# Patient Record
Sex: FEMALE | Race: WHITE | NOT HISPANIC OR LATINO | Employment: OTHER | URBAN - METROPOLITAN AREA
[De-identification: names, ages, dates, MRNs, and addresses within clinical notes are randomized per-mention and may not be internally consistent; named-entity substitution may affect disease eponyms.]

---

## 2023-02-17 PROBLEM — R23.9 SKIN SYMPTOM: Status: ACTIVE | Noted: 2023-02-17

## 2023-02-17 PROBLEM — E03.9 HYPOTHYROIDISM: Status: ACTIVE | Noted: 2023-02-17

## 2023-02-17 PROBLEM — M85.80 OSTEOPENIA: Status: ACTIVE | Noted: 2023-02-17

## 2023-02-17 PROBLEM — K52.832 LYMPHOCYTIC COLITIS: Status: ACTIVE | Noted: 2023-02-17

## 2023-02-17 PROBLEM — M25.80 SESAMOIDITIS: Status: ACTIVE | Noted: 2023-02-17

## 2023-02-17 PROBLEM — D51.1: Status: ACTIVE | Noted: 2023-02-17

## 2023-02-17 PROBLEM — R09.82 POSTNASAL DRIP: Status: ACTIVE | Noted: 2023-02-17

## 2023-02-17 PROBLEM — R68.84 JAW PAIN: Status: ACTIVE | Noted: 2023-02-17

## 2023-02-17 PROBLEM — M19.032: Status: ACTIVE | Noted: 2023-02-17

## 2023-02-17 PROBLEM — R60.0 LEG EDEMA, LEFT: Status: ACTIVE | Noted: 2023-02-17

## 2023-02-17 PROBLEM — M54.2 CERVICALGIA: Status: ACTIVE | Noted: 2023-02-17

## 2023-02-17 PROBLEM — H91.90 HEARING LOSS: Status: ACTIVE | Noted: 2023-02-17

## 2023-02-17 PROBLEM — R26.81 UNSTEADY GAIT: Status: ACTIVE | Noted: 2023-02-17

## 2023-02-17 PROBLEM — R63.4 ABNORMAL WEIGHT LOSS: Status: ACTIVE | Noted: 2023-02-17

## 2023-02-17 PROBLEM — R25.1 EPISODE OF SHAKING: Status: ACTIVE | Noted: 2023-02-17

## 2023-02-17 PROBLEM — R26.2 DIFFICULTY WALKING: Status: ACTIVE | Noted: 2023-02-17

## 2023-02-17 PROBLEM — E53.8 VITAMIN B12 DEFICIENCY: Status: ACTIVE | Noted: 2023-02-17

## 2023-02-17 PROBLEM — M17.0 PRIMARY OSTEOARTHRITIS OF BOTH KNEES: Status: ACTIVE | Noted: 2023-02-17

## 2023-02-17 PROBLEM — N39.0 ACUTE UTI: Status: ACTIVE | Noted: 2023-02-17

## 2023-02-17 PROBLEM — M76.72 PERONEAL TENDONITIS, LEFT: Status: ACTIVE | Noted: 2023-02-17

## 2023-02-17 PROBLEM — K59.09 CHRONIC CONSTIPATION: Status: ACTIVE | Noted: 2023-02-17

## 2023-02-17 PROBLEM — K13.0 ANGULAR CHEILITIS: Status: ACTIVE | Noted: 2023-02-17

## 2023-02-17 PROBLEM — R91.1 PULMONARY NODULE: Status: ACTIVE | Noted: 2023-02-17

## 2023-02-17 PROBLEM — H65.90 SEROUS OTITIS MEDIA: Status: ACTIVE | Noted: 2023-02-17

## 2023-02-17 PROBLEM — H90.3 BILATERAL SENSORINEURAL HEARING LOSS: Status: ACTIVE | Noted: 2023-02-17

## 2023-02-17 PROBLEM — M25.549 METACARPOPHALANGEAL JOINT PAIN: Status: ACTIVE | Noted: 2023-02-17

## 2023-02-17 PROBLEM — M25.672 STIFFNESS OF LEFT ANKLE, NOT ELSEWHERE CLASSIFIED: Status: ACTIVE | Noted: 2023-02-17

## 2023-02-17 PROBLEM — R68.83 CHILLS (WITHOUT FEVER): Status: ACTIVE | Noted: 2023-02-17

## 2023-02-17 PROBLEM — I10 BENIGN ESSENTIAL HYPERTENSION: Status: ACTIVE | Noted: 2023-02-17

## 2023-02-17 PROBLEM — K90.41 GLUTEN ENTEROPATHY: Status: ACTIVE | Noted: 2023-02-17

## 2023-02-17 PROBLEM — J30.2 SEASONAL ALLERGIES: Status: ACTIVE | Noted: 2023-02-17

## 2023-02-17 PROBLEM — S09.92XA NASAL TRAUMA: Status: ACTIVE | Noted: 2023-02-17

## 2023-02-17 PROBLEM — M16.12 PRIMARY LOCALIZED OSTEOARTHRITIS OF LEFT HIP: Status: ACTIVE | Noted: 2023-02-17

## 2023-02-17 PROBLEM — M17.12 OSTEOARTHRITIS OF LEFT KNEE: Status: ACTIVE | Noted: 2023-02-17

## 2023-02-17 PROBLEM — E87.1 HYPONATREMIA: Status: ACTIVE | Noted: 2023-02-17

## 2023-02-17 PROBLEM — M19.049 ARTHRITIS, DEGENERATIVE, LOCALIZED, PRIMARY, HAND: Status: ACTIVE | Noted: 2023-02-17

## 2023-02-17 PROBLEM — R54 ADVANCED AGE: Status: ACTIVE | Noted: 2023-02-17

## 2023-02-17 PROBLEM — J31.0 CHRONIC RHINITIS: Status: ACTIVE | Noted: 2023-02-17

## 2023-02-17 PROBLEM — F43.10 PTSD (POST-TRAUMATIC STRESS DISORDER): Status: ACTIVE | Noted: 2023-02-17

## 2023-02-17 PROBLEM — M35.00 H/O SJOGREN'S DISEASE (MULTI): Status: ACTIVE | Noted: 2023-02-17

## 2023-02-17 PROBLEM — R42 VERTIGO: Status: ACTIVE | Noted: 2023-02-17

## 2023-02-17 RX ORDER — CLOTRIMAZOLE 1 %
CREAM (GRAM) TOPICAL
COMMUNITY
Start: 2021-05-26 | End: 2023-03-20 | Stop reason: SDUPTHER

## 2023-02-17 RX ORDER — LISINOPRIL 10 MG/1
1 TABLET ORAL DAILY
COMMUNITY
Start: 2018-09-28 | End: 2023-03-20 | Stop reason: SDUPTHER

## 2023-02-17 RX ORDER — LEVOTHYROXINE SODIUM 50 UG/1
50 TABLET ORAL DAILY
COMMUNITY
Start: 2012-11-12 | End: 2023-07-26 | Stop reason: SDUPTHER

## 2023-03-01 ENCOUNTER — DOCUMENTATION (OUTPATIENT)
Dept: PRIMARY CARE | Facility: CLINIC | Age: 88
End: 2023-03-01
Payer: MEDICARE

## 2023-03-13 ENCOUNTER — PATIENT OUTREACH (OUTPATIENT)
Dept: PRIMARY CARE | Facility: CLINIC | Age: 88
End: 2023-03-13
Payer: MEDICARE

## 2023-03-17 ENCOUNTER — APPOINTMENT (OUTPATIENT)
Dept: PRIMARY CARE | Facility: CLINIC | Age: 88
End: 2023-03-17
Payer: MEDICARE

## 2023-03-17 ENCOUNTER — TELEPHONE (OUTPATIENT)
Dept: PRIMARY CARE | Facility: CLINIC | Age: 88
End: 2023-03-17

## 2023-03-17 DIAGNOSIS — N30.00 ACUTE CYSTITIS WITHOUT HEMATURIA: Primary | ICD-10-CM

## 2023-03-17 RX ORDER — NITROFURANTOIN 25; 75 MG/1; MG/1
100 CAPSULE ORAL 2 TIMES DAILY
Qty: 14 EACH | Refills: 0 | Status: SHIPPED | OUTPATIENT
Start: 2023-03-17 | End: 2023-03-20 | Stop reason: ENTERED-IN-ERROR

## 2023-03-17 NOTE — PROGRESS NOTES
I received a call from the patient who stated that she has a UTI.  Patient has burning with urination.  Urine analysis and culture ordered.  Patient will come to the lab today had give a sample.  Patient stated that her bowels are okay now.  They are not dark in color and are a normal consistency.

## 2023-03-20 DIAGNOSIS — R23.9 SKIN SYMPTOM: ICD-10-CM

## 2023-03-20 DIAGNOSIS — I10 BENIGN ESSENTIAL HYPERTENSION: Primary | ICD-10-CM

## 2023-03-20 RX ORDER — LISINOPRIL 10 MG/1
10 TABLET ORAL DAILY
Qty: 90 TABLET | Refills: 2 | Status: SHIPPED | OUTPATIENT
Start: 2023-03-20 | End: 2023-12-12 | Stop reason: SDUPTHER

## 2023-03-20 RX ORDER — CLOTRIMAZOLE 1 %
CREAM (GRAM) TOPICAL 2 TIMES DAILY
Qty: 14 G | Refills: 1 | Status: SHIPPED | OUTPATIENT
Start: 2023-03-20 | End: 2023-04-19

## 2023-03-30 ENCOUNTER — APPOINTMENT (OUTPATIENT)
Dept: PRIMARY CARE | Facility: CLINIC | Age: 88
End: 2023-03-30
Payer: MEDICARE

## 2023-04-17 ENCOUNTER — PATIENT OUTREACH (OUTPATIENT)
Dept: PRIMARY CARE | Facility: CLINIC | Age: 88
End: 2023-04-17
Payer: MEDICARE

## 2023-04-17 NOTE — PROGRESS NOTES
I called and spoke with the patient who stated that she was doing well.  No change in her health since we last spoke.  Patient is taking CBD gummies for her joints on recommendation of her therapist.  Per the patient the gummies help her joints. No refills needed at this time.  Patient will call with any questions or concerns.

## 2023-05-12 ENCOUNTER — TELEPHONE (OUTPATIENT)
Dept: PRIMARY CARE | Facility: CLINIC | Age: 88
End: 2023-05-12
Payer: MEDICARE

## 2023-05-12 NOTE — TELEPHONE ENCOUNTER
Patient would like to confirm if blood work is necessary for upcoming appointment. Please advise at home number.

## 2023-05-16 ENCOUNTER — PATIENT OUTREACH (OUTPATIENT)
Dept: PRIMARY CARE | Facility: CLINIC | Age: 88
End: 2023-05-16
Payer: MEDICARE

## 2023-05-16 NOTE — PROGRESS NOTES
I called and spoke with the patient who stated that she is doing well.   Pert he patient there are no changes to her health since we last spoke.  No medication refills needed at this time.  Patient is taking 1 CBD gummy daily for her joint pain with good results.  Her daughter or caregiver provide transportation for her.  Patient has a follow up with Dr Perez in August.  No sooner appointments available at this time.  Patient to call with any questions or concerns.

## 2023-06-16 ENCOUNTER — PATIENT OUTREACH (OUTPATIENT)
Dept: PRIMARY CARE | Facility: CLINIC | Age: 88
End: 2023-06-16
Payer: COMMERCIAL

## 2023-06-16 DIAGNOSIS — M35.00 H/O SJOGREN'S DISEASE (MULTI): ICD-10-CM

## 2023-06-16 DIAGNOSIS — I10 BENIGN ESSENTIAL HYPERTENSION: ICD-10-CM

## 2023-06-16 PROCEDURE — 99490 CHRNC CARE MGMT STAFF 1ST 20: CPT | Performed by: INTERNAL MEDICINE

## 2023-06-16 NOTE — PROGRESS NOTES
I called and spoke with the patient who stated that she was doing well for her age.  Patient is concerned about her daughter who has skin CA and had surgery.  Patient denies any falls since we last spoke.  Patient is using her cane and walker which maker her feel more confident. Patient is taking her medications as directed.  No refills needed at this time.  Patient will call with any questions or concerns.

## 2023-07-26 DIAGNOSIS — E03.9 ACQUIRED HYPOTHYROIDISM: Primary | ICD-10-CM

## 2023-07-26 RX ORDER — LEVOTHYROXINE SODIUM 50 UG/1
50 TABLET ORAL DAILY
Qty: 90 TABLET | Refills: 1 | Status: SHIPPED | OUTPATIENT
Start: 2023-07-26 | End: 2024-01-23 | Stop reason: SDUPTHER

## 2023-08-15 ENCOUNTER — PATIENT OUTREACH (OUTPATIENT)
Dept: PRIMARY CARE | Facility: CLINIC | Age: 88
End: 2023-08-15
Payer: COMMERCIAL

## 2023-08-15 DIAGNOSIS — M35.00 H/O SJOGREN'S DISEASE (MULTI): ICD-10-CM

## 2023-08-15 DIAGNOSIS — I10 BENIGN ESSENTIAL HYPERTENSION: ICD-10-CM

## 2023-08-16 ENCOUNTER — PATIENT OUTREACH (OUTPATIENT)
Dept: PRIMARY CARE | Facility: CLINIC | Age: 88
End: 2023-08-16
Payer: MEDICARE

## 2023-08-16 DIAGNOSIS — M35.00 H/O SJOGREN'S DISEASE (MULTI): ICD-10-CM

## 2023-08-16 DIAGNOSIS — I10 BENIGN ESSENTIAL HYPERTENSION: ICD-10-CM

## 2023-08-16 PROCEDURE — 99490 CHRNC CARE MGMT STAFF 1ST 20: CPT | Performed by: INTERNAL MEDICINE

## 2023-08-16 NOTE — PROGRESS NOTES
I received a call from the patient who stated that she is doing well for being 89 years old.  Per the patient there has been no changes in her health.  He weight is stable.  No medication refills needed.  Patient has a appointment with Dr Perez 8/29/2023.  Patient was instructed to call with any questions or concerns.

## 2023-08-29 ENCOUNTER — OFFICE VISIT (OUTPATIENT)
Dept: PRIMARY CARE | Facility: CLINIC | Age: 88
End: 2023-08-29
Payer: COMMERCIAL

## 2023-08-29 VITALS
RESPIRATION RATE: 16 BRPM | WEIGHT: 106.8 LBS | BODY MASS INDEX: 20.86 KG/M2 | SYSTOLIC BLOOD PRESSURE: 119 MMHG | HEART RATE: 73 BPM | TEMPERATURE: 97.9 F | DIASTOLIC BLOOD PRESSURE: 69 MMHG | OXYGEN SATURATION: 99 %

## 2023-08-29 DIAGNOSIS — M35.00 H/O SJOGREN'S DISEASE (MULTI): ICD-10-CM

## 2023-08-29 DIAGNOSIS — M54.2 CERVICALGIA: ICD-10-CM

## 2023-08-29 DIAGNOSIS — H90.3 BILATERAL SENSORINEURAL HEARING LOSS: ICD-10-CM

## 2023-08-29 DIAGNOSIS — R54 ADVANCED AGE: ICD-10-CM

## 2023-08-29 DIAGNOSIS — I10 BENIGN ESSENTIAL HYPERTENSION: Primary | ICD-10-CM

## 2023-08-29 DIAGNOSIS — Z51.5 QUALITY OF LIFE PALLIATIVE CARE ENCOUNTER: ICD-10-CM

## 2023-08-29 DIAGNOSIS — E03.9 ACQUIRED HYPOTHYROIDISM: ICD-10-CM

## 2023-08-29 DIAGNOSIS — M35.00 SJOGREN SYNDROME, UNSPECIFIED (MULTI): ICD-10-CM

## 2023-08-29 DIAGNOSIS — J30.2 SEASONAL ALLERGIES: ICD-10-CM

## 2023-08-29 DIAGNOSIS — E53.8 VITAMIN B12 DEFICIENCY: ICD-10-CM

## 2023-08-29 DIAGNOSIS — R26.81 UNSTEADY GAIT: ICD-10-CM

## 2023-08-29 PROBLEM — Z85.828 PERSONAL HISTORY OF OTHER MALIGNANT NEOPLASM OF SKIN: Status: ACTIVE | Noted: 2022-01-08

## 2023-08-29 PROBLEM — L03.90 CELLULITIS: Status: ACTIVE | Noted: 2023-08-29

## 2023-08-29 PROBLEM — L82.1 OTHER SEBORRHEIC KERATOSIS: Status: ACTIVE | Noted: 2022-01-08

## 2023-08-29 PROBLEM — L28.0 LICHEN SIMPLEX CHRONICUS: Status: ACTIVE | Noted: 2022-01-08

## 2023-08-29 PROBLEM — L60.9 NAIL DISORDER, UNSPECIFIED: Status: ACTIVE | Noted: 2022-01-08

## 2023-08-29 PROBLEM — R68.89 LIP SYMPTOM: Status: ACTIVE | Noted: 2023-08-29

## 2023-08-29 PROBLEM — L71.0 PERIORAL DERMATITIS: Status: ACTIVE | Noted: 2022-01-08

## 2023-08-29 PROBLEM — R21 RASH AND OTHER NONSPECIFIC SKIN ERUPTION: Status: ACTIVE | Noted: 2022-01-08

## 2023-08-29 PROCEDURE — 99215 OFFICE O/P EST HI 40 MIN: CPT | Performed by: INTERNAL MEDICINE

## 2023-08-29 PROCEDURE — 1160F RVW MEDS BY RX/DR IN RCRD: CPT | Performed by: INTERNAL MEDICINE

## 2023-08-29 PROCEDURE — 3074F SYST BP LT 130 MM HG: CPT | Performed by: INTERNAL MEDICINE

## 2023-08-29 PROCEDURE — 3078F DIAST BP <80 MM HG: CPT | Performed by: INTERNAL MEDICINE

## 2023-08-29 PROCEDURE — 1036F TOBACCO NON-USER: CPT | Performed by: INTERNAL MEDICINE

## 2023-08-29 PROCEDURE — 1125F AMNT PAIN NOTED PAIN PRSNT: CPT | Performed by: INTERNAL MEDICINE

## 2023-08-29 PROCEDURE — 1159F MED LIST DOCD IN RCRD: CPT | Performed by: INTERNAL MEDICINE

## 2023-08-29 RX ORDER — UBIDECARENONE 75 MG
500 CAPSULE ORAL DAILY
COMMUNITY

## 2023-08-29 RX ORDER — MUPIROCIN 20 MG/G
OINTMENT TOPICAL
COMMUNITY
Start: 2023-02-16

## 2023-08-29 RX ORDER — AMLODIPINE BESYLATE 5 MG/1
1 TABLET ORAL DAILY
COMMUNITY
End: 2024-01-22

## 2023-08-29 RX ORDER — LIDOCAINE 50 MG/G
PATCH TOPICAL
COMMUNITY
Start: 2018-06-15

## 2023-08-29 RX ORDER — CYANOCOBALAMIN 1000 UG/ML
1 INJECTION, SOLUTION INTRAMUSCULAR; SUBCUTANEOUS
COMMUNITY

## 2023-08-29 RX ORDER — IBUPROFEN 400 MG/1
TABLET ORAL EVERY 8 HOURS PRN
COMMUNITY
Start: 2015-02-09

## 2023-08-29 RX ORDER — FLUTICASONE PROPIONATE 50 MCG
SPRAY, SUSPENSION (ML) NASAL
COMMUNITY

## 2023-08-29 RX ORDER — TRIAMCINOLONE ACETONIDE 55 UG/1
SPRAY, METERED NASAL
COMMUNITY
Start: 2023-06-13

## 2023-08-29 ASSESSMENT — PATIENT HEALTH QUESTIONNAIRE - PHQ9
SUM OF ALL RESPONSES TO PHQ9 QUESTIONS 1 AND 2: 0
2. FEELING DOWN, DEPRESSED OR HOPELESS: NOT AT ALL
1. LITTLE INTEREST OR PLEASURE IN DOING THINGS: NOT AT ALL

## 2023-08-29 ASSESSMENT — ENCOUNTER SYMPTOMS
LOSS OF SENSATION IN FEET: 0
DEPRESSION: 0
OCCASIONAL FEELINGS OF UNSTEADINESS: 1
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
## 2023-08-30 PROBLEM — R09.82 POSTNASAL DRIP: Status: RESOLVED | Noted: 2023-02-17 | Resolved: 2023-08-30

## 2023-08-30 PROBLEM — L03.90 CELLULITIS: Status: RESOLVED | Noted: 2023-08-29 | Resolved: 2023-08-30

## 2023-08-30 PROBLEM — H91.90 HEARING LOSS: Status: RESOLVED | Noted: 2023-02-17 | Resolved: 2023-08-30

## 2023-08-30 PROBLEM — S09.92XA NASAL TRAUMA: Status: RESOLVED | Noted: 2023-02-17 | Resolved: 2023-08-30

## 2023-08-30 PROBLEM — R63.4 ABNORMAL WEIGHT LOSS: Status: RESOLVED | Noted: 2023-02-17 | Resolved: 2023-08-30

## 2023-09-15 ENCOUNTER — PATIENT OUTREACH (OUTPATIENT)
Dept: PRIMARY CARE | Facility: CLINIC | Age: 88
End: 2023-09-15
Payer: MEDICARE

## 2023-09-15 DIAGNOSIS — M35.00 SJOGREN SYNDROME, UNSPECIFIED (MULTI): ICD-10-CM

## 2023-09-15 DIAGNOSIS — I10 BENIGN ESSENTIAL HYPERTENSION: ICD-10-CM

## 2023-09-18 ENCOUNTER — PATIENT OUTREACH (OUTPATIENT)
Dept: PRIMARY CARE | Facility: CLINIC | Age: 88
End: 2023-09-18
Payer: MEDICARE

## 2023-09-18 DIAGNOSIS — I10 BENIGN ESSENTIAL HYPERTENSION: ICD-10-CM

## 2023-09-18 DIAGNOSIS — M35.00 SJOGREN SYNDROME, UNSPECIFIED (MULTI): ICD-10-CM

## 2023-09-18 PROCEDURE — 99490 CHRNC CARE MGMT STAFF 1ST 20: CPT | Performed by: INTERNAL MEDICINE

## 2023-09-18 PROCEDURE — 99439 CHRNC CARE MGMT STAF EA ADDL: CPT | Performed by: INTERNAL MEDICINE

## 2023-09-18 NOTE — PROGRESS NOTES
I called and spoke with the patient who stated that she was doing well.  Per the patient she has another aid helping her at home.  Patient likes her very much.  [facility] like [facility] Perez requested.  Per the patient she is not able to eat there because she is gluten free.  Patient also felt the the average person there was 25 years her stone.  Patient did not feel comfortable going there.  Per the patient she stays active with her aids going out to lunch and occasionally [place].    Patient denies any change in health since we last spoke or she saw Dr. Perez.  Patient call her refills into [employer].  Patient was instructed to call with any questions or concerns.

## 2023-09-21 ENCOUNTER — PATIENT OUTREACH (OUTPATIENT)
Dept: PRIMARY CARE | Facility: CLINIC | Age: 88
End: 2023-09-21
Payer: MEDICARE

## 2023-09-21 DIAGNOSIS — M35.00 SJOGREN SYNDROME, UNSPECIFIED (MULTI): ICD-10-CM

## 2023-09-21 DIAGNOSIS — I10 BENIGN ESSENTIAL HYPERTENSION: ICD-10-CM
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
## 2023-10-02 ENCOUNTER — TELEPHONE (OUTPATIENT)
Dept: PRIMARY CARE | Facility: CLINIC | Age: 88
End: 2023-10-02
Payer: MEDICARE

## 2023-10-02 DIAGNOSIS — M16.12 PRIMARY LOCALIZED OSTEOARTHRITIS OF LEFT HIP: Primary | ICD-10-CM

## 2023-10-02 DIAGNOSIS — R26.81 UNSTEADY GAIT: ICD-10-CM

## 2023-10-03 ENCOUNTER — PATIENT OUTREACH (OUTPATIENT)
Dept: PRIMARY CARE | Facility: CLINIC | Age: 88
End: 2023-10-03
Payer: MEDICARE

## 2023-10-03 DIAGNOSIS — I10 BENIGN ESSENTIAL HYPERTENSION: ICD-10-CM

## 2023-10-03 DIAGNOSIS — M35.00 SJOGREN SYNDROME, UNSPECIFIED (MULTI): ICD-10-CM

## 2023-10-10 ENCOUNTER — CLINICAL SUPPORT (OUTPATIENT)
Dept: AUDIOLOGY | Facility: CLINIC | Age: 88
End: 2023-10-10
Payer: MEDICARE

## 2023-10-10 DIAGNOSIS — H90.3 SENSORINEURAL HEARING LOSS (SNHL) OF BOTH EARS: Primary | ICD-10-CM

## 2023-10-10 PROCEDURE — 92593 HC HEARING AID CHECK, BOTH EARS LEVEL ONE: CPT | Performed by: AUDIOLOGIST

## 2023-10-11 ASSESSMENT — PAIN SCALES - GENERAL: PAINLEVEL_OUTOF10: 0 - NO PAIN

## 2023-10-11 ASSESSMENT — PAIN - FUNCTIONAL ASSESSMENT: PAIN_FUNCTIONAL_ASSESSMENT: 0-10

## 2023-10-11 ASSESSMENT — ENCOUNTER SYMPTOMS: OCCASIONAL FEELINGS OF UNSTEADINESS: 0

## 2023-10-11 NOTE — PROGRESS NOTES
AUDIOLOGY HEARING AID CHECK      Name:  Jennifer Rangel  :  [id number]  Age:  89 y.o.  Date of Visit: 10/11/23    History:  Reason for visit:  Ms. Rangel is seen today for a hearing aid check.      Procedure:   Note, the patient is currently wearing the following devices:   Right Ear:            Make/Model: Phonak Audeo P50R            Dome/: small closed dome; #1 S             Serial Number: [id number]            Warranty: 2024     Left Ear:            Make/Model: Phonak Audeo P50R            Dome/: small closed dome; #1 S             Serial Number: [id number]            Warranty: 2024  Using a large  for lithium ion batteries.   Aids dispensed on 2021 and billed to [employer].      Otoscopic Evaluation:   Right Ear: Otoscopy for the right ear revealed a clear healthy canal and a healthy tympanic membrane was visualized.   Left Ear: Otoscopy for the left ear revealed a clear healthy canal and a healthy tympanic membrane was visualized.     The patient stated she is not having any major concerns with the hearing aids but is interested in a current check. Stated overall she is hearing well, however, at times she has noticed certain speakers are a little too soft.  Listening check initially indicated the aids were a little weak.   Changed the wax guards, cleaned the microphones and replaced with new domes. Increased to 90% acclimation level and the patient felt she was hearing well.   Increased the soft compression and decreased the loud speech slightly. Stated she was hearing well with the current program changes.   Denied any additional adjustments. Reported she is hearing and doing well with the current aids.     RECOMMENDATIONS:  * Continue medical follow up with managing physician.  * Continued use of current hearing aids.   * Use effective communication strategies such as asking the speaker to gain attention prior to speaking, speaking  in the same room, repeating words that were heard, etc.  * Return as needed for annual hearing testing and hearing aid checks.     PATIENT EDUCATION:   Questions were addressed and the patient was encouraged to contact our department should concerns arise.  The patient was seen from  2:20-3:35 pm.

## 2023-11-06 ENCOUNTER — PATIENT OUTREACH (OUTPATIENT)
Dept: PRIMARY CARE | Facility: CLINIC | Age: 88
End: 2023-11-06
Payer: MEDICARE

## 2023-11-06 DIAGNOSIS — I10 BENIGN ESSENTIAL HYPERTENSION: ICD-10-CM

## 2023-11-06 DIAGNOSIS — M35.00 SJOGREN SYNDROME, UNSPECIFIED (MULTI): ICD-10-CM

## 2023-11-06 PROCEDURE — 99439 CHRNC CARE MGMT STAF EA ADDL: CPT | Performed by: INTERNAL MEDICINE

## 2023-11-06 PROCEDURE — 99490 CHRNC CARE MGMT STAFF 1ST 20: CPT | Performed by: INTERNAL MEDICINE

## 2023-11-06 NOTE — PROGRESS NOTES
I called and spoke with the patient who stated that she is doing well.  Patient is living with her daughter and has a new caregiver helping her around the house and transportation.  Patient looked at [facility] and did not feel as though it was for her.  She is staying active by going to community events.  Patient looked into palliative care and did not qualify.  Patient is using lido cream on her arm which is helping to decrease the pain.  No medication refills needed at this time.  Patient will call with any questions or concerns.

## 2023-11-30 ENCOUNTER — PATIENT OUTREACH (OUTPATIENT)
Dept: PRIMARY CARE | Facility: CLINIC | Age: 88
End: 2023-11-30
Payer: MEDICARE

## 2023-11-30 DIAGNOSIS — H90.3 BILATERAL SENSORINEURAL HEARING LOSS: ICD-10-CM

## 2023-11-30 DIAGNOSIS — I10 BENIGN ESSENTIAL HYPERTENSION: ICD-10-CM

## 2023-11-30 NOTE — PROGRESS NOTES
I received a call from the patient who was having difficulty making an Optometry appointment.  I called and made her a yearly follow up with Dr Aishwarya De La Fuente for 3/11/2024.  Patient is aware.

## 2023-12-12 DIAGNOSIS — I10 BENIGN ESSENTIAL HYPERTENSION: ICD-10-CM

## 2023-12-12 RX ORDER — LISINOPRIL 10 MG/1
10 TABLET ORAL DAILY
Qty: 90 TABLET | Refills: 3 | Status: SHIPPED | OUTPATIENT
Start: 2023-12-12

## 2023-12-12 NOTE — TELEPHONE ENCOUNTER
Pt needs a refill on Lisinipril please send to [facility] and [place]  Call w/questions

## 2024-01-22 ENCOUNTER — PATIENT OUTREACH (OUTPATIENT)
Dept: PRIMARY CARE | Facility: CLINIC | Age: 89
End: 2024-01-22
Payer: MEDICARE

## 2024-01-22 DIAGNOSIS — E03.9 ACQUIRED HYPOTHYROIDISM: ICD-10-CM

## 2024-01-22 DIAGNOSIS — M35.00 SJOGREN SYNDROME, UNSPECIFIED (MULTI): ICD-10-CM

## 2024-01-22 DIAGNOSIS — I10 BENIGN ESSENTIAL HYPERTENSION: ICD-10-CM

## 2024-01-22 PROCEDURE — 99490 CHRNC CARE MGMT STAFF 1ST 20: CPT | Performed by: INTERNAL MEDICINE

## 2024-01-22 NOTE — PROGRESS NOTES
I called and spoke with the patient who stated that she is doing well.  Per the patient she is getting out of the house when the weather permits.  Medications reviewed.  No refills needed at this time.  Patient will see Dr Perez 2/5/2024.  Instructed to call with any questions or concerns.

## 2024-01-22 NOTE — TELEPHONE ENCOUNTER
Patient needs refill on Levothyroxine patient also asking for lab order prior to her February appointment     Please call patient when lab is placed

## 2024-01-23 RX ORDER — LEVOTHYROXINE SODIUM 50 UG/1
50 TABLET ORAL DAILY
Qty: 90 TABLET | Refills: 1 | Status: SHIPPED | OUTPATIENT
Start: 2024-01-23

## 2024-01-29 ENCOUNTER — TELEPHONE (OUTPATIENT)
Dept: PRIMARY CARE | Facility: CLINIC | Age: 89
End: 2024-01-29

## 2024-01-29 ENCOUNTER — LAB (OUTPATIENT)
Dept: LAB | Facility: LAB | Age: 89
End: 2024-01-29
Payer: MEDICARE

## 2024-01-29 DIAGNOSIS — E55.9 VITAMIN D DEFICIENCY: ICD-10-CM

## 2024-01-29 DIAGNOSIS — M35.00 SJOGREN SYNDROME, UNSPECIFIED (MULTI): ICD-10-CM

## 2024-01-29 DIAGNOSIS — E03.9 ACQUIRED HYPOTHYROIDISM: ICD-10-CM

## 2024-01-29 DIAGNOSIS — E53.8 VITAMIN B12 DEFICIENCY: ICD-10-CM

## 2024-01-29 DIAGNOSIS — E55.9 VITAMIN D DEFICIENCY: Primary | ICD-10-CM

## 2024-01-29 DIAGNOSIS — I10 BENIGN ESSENTIAL HYPERTENSION: ICD-10-CM

## 2024-01-29 DIAGNOSIS — E87.1 HYPONATREMIA: ICD-10-CM

## 2024-01-29 LAB
25(OH)D3 SERPL-MCNC: 58 NG/ML (ref 30–100)
ALBUMIN SERPL BCP-MCNC: 3.7 G/DL (ref 3.4–5)
ALP SERPL-CCNC: 65 U/L (ref 33–136)
ALT SERPL W P-5'-P-CCNC: 17 U/L (ref 7–45)
ANION GAP SERPL CALC-SCNC: 11 MMOL/L (ref 10–20)
AST SERPL W P-5'-P-CCNC: 20 U/L (ref 9–39)
BILIRUB SERPL-MCNC: 0.8 MG/DL (ref 0–1.2)
BUN SERPL-MCNC: 18 MG/DL (ref 6–23)
CALCIUM SERPL-MCNC: 9.2 MG/DL (ref 8.6–10.3)
CHLORIDE SERPL-SCNC: 102 MMOL/L (ref 98–107)
CO2 SERPL-SCNC: 28 MMOL/L (ref 21–32)
CREAT SERPL-MCNC: 0.65 MG/DL (ref 0.5–1.05)
EGFRCR SERPLBLD CKD-EPI 2021: 81 ML/MIN/1.73M*2
ERYTHROCYTE [DISTWIDTH] IN BLOOD BY AUTOMATED COUNT: 14.8 % (ref 11.5–14.5)
GLUCOSE SERPL-MCNC: 89 MG/DL (ref 74–99)
HCT VFR BLD AUTO: 40.4 % (ref 36–46)
HGB BLD-MCNC: 13.9 G/DL (ref 12–16)
MCH RBC QN AUTO: 35 PG (ref 26–34)
MCHC RBC AUTO-ENTMCNC: 34.4 G/DL (ref 32–36)
MCV RBC AUTO: 102 FL (ref 80–100)
NRBC BLD-RTO: 0 /100 WBCS (ref 0–0)
PLATELET # BLD AUTO: 257 X10*3/UL (ref 150–450)
POTASSIUM SERPL-SCNC: 4.5 MMOL/L (ref 3.5–5.3)
PROT SERPL-MCNC: 6.5 G/DL (ref 6.4–8.2)
RBC # BLD AUTO: 3.97 X10*6/UL (ref 4–5.2)
SODIUM SERPL-SCNC: 136 MMOL/L (ref 136–145)
T4 FREE SERPL-MCNC: 1.34 NG/DL (ref 0.61–1.12)
TSH SERPL-ACNC: 6.85 MIU/L (ref 0.44–3.98)
VIT B12 SERPL-MCNC: 607 PG/ML (ref 211–911)
WBC # BLD AUTO: 5.4 X10*3/UL (ref 4.4–11.3)

## 2024-01-29 PROCEDURE — 80053 COMPREHEN METABOLIC PANEL: CPT

## 2024-01-29 PROCEDURE — 85027 COMPLETE CBC AUTOMATED: CPT

## 2024-01-29 PROCEDURE — 84439 ASSAY OF FREE THYROXINE: CPT

## 2024-01-29 PROCEDURE — 36415 COLL VENOUS BLD VENIPUNCTURE: CPT

## 2024-01-29 PROCEDURE — 82607 VITAMIN B-12: CPT

## 2024-01-29 PROCEDURE — 82306 VITAMIN D 25 HYDROXY: CPT

## 2024-01-29 PROCEDURE — 84443 ASSAY THYROID STIM HORMONE: CPT

## 2024-02-05 ENCOUNTER — OFFICE VISIT (OUTPATIENT)
Dept: PRIMARY CARE | Facility: CLINIC | Age: 89
End: 2024-02-05
Payer: MEDICARE

## 2024-02-05 VITALS
TEMPERATURE: 97.9 F | HEIGHT: 60 IN | BODY MASS INDEX: 20.42 KG/M2 | WEIGHT: 104 LBS | RESPIRATION RATE: 16 BRPM | HEART RATE: 67 BPM | SYSTOLIC BLOOD PRESSURE: 138 MMHG | DIASTOLIC BLOOD PRESSURE: 84 MMHG | OXYGEN SATURATION: 96 %

## 2024-02-05 DIAGNOSIS — R68.84 JAW PAIN: ICD-10-CM

## 2024-02-05 DIAGNOSIS — M35.00 H/O SJOGREN'S DISEASE (MULTI): ICD-10-CM

## 2024-02-05 DIAGNOSIS — E53.8 VITAMIN B12 DEFICIENCY: ICD-10-CM

## 2024-02-05 DIAGNOSIS — E03.9 ACQUIRED HYPOTHYROIDISM: ICD-10-CM

## 2024-02-05 DIAGNOSIS — R54 ADVANCED AGE: ICD-10-CM

## 2024-02-05 DIAGNOSIS — J31.0 CHRONIC RHINITIS: ICD-10-CM

## 2024-02-05 DIAGNOSIS — I10 BENIGN ESSENTIAL HYPERTENSION: Primary | ICD-10-CM

## 2024-02-05 DIAGNOSIS — E46 PROTEIN-CALORIE MALNUTRITION, UNSPECIFIED SEVERITY (MULTI): ICD-10-CM

## 2024-02-05 PROBLEM — K52.9 COLITIS: Status: ACTIVE | Noted: 2023-02-17

## 2024-02-05 PROCEDURE — 1159F MED LIST DOCD IN RCRD: CPT | Performed by: INTERNAL MEDICINE

## 2024-02-05 PROCEDURE — 1157F ADVNC CARE PLAN IN RCRD: CPT | Performed by: INTERNAL MEDICINE

## 2024-02-05 PROCEDURE — 1036F TOBACCO NON-USER: CPT | Performed by: INTERNAL MEDICINE

## 2024-02-05 PROCEDURE — 3075F SYST BP GE 130 - 139MM HG: CPT | Performed by: INTERNAL MEDICINE

## 2024-02-05 PROCEDURE — 99214 OFFICE O/P EST MOD 30 MIN: CPT | Performed by: INTERNAL MEDICINE

## 2024-02-05 PROCEDURE — 1125F AMNT PAIN NOTED PAIN PRSNT: CPT | Performed by: INTERNAL MEDICINE

## 2024-02-05 PROCEDURE — 3078F DIAST BP <80 MM HG: CPT | Performed by: INTERNAL MEDICINE

## 2024-02-05 PROCEDURE — 1123F ACP DISCUSS/DSCN MKR DOCD: CPT | Performed by: INTERNAL MEDICINE

## 2024-02-05 PROCEDURE — 1160F RVW MEDS BY RX/DR IN RCRD: CPT | Performed by: INTERNAL MEDICINE

## 2024-02-05 ASSESSMENT — ENCOUNTER SYMPTOMS
LOSS OF SENSATION IN FEET: 0
OCCASIONAL FEELINGS OF UNSTEADINESS: 1
DEPRESSION: 0

## 2024-02-05 ASSESSMENT — PATIENT HEALTH QUESTIONNAIRE - PHQ9
2. FEELING DOWN, DEPRESSED OR HOPELESS: NOT AT ALL
1. LITTLE INTEREST OR PLEASURE IN DOING THINGS: NOT AT ALL
SUM OF ALL RESPONSES TO PHQ9 QUESTIONS 1 AND 2: 0
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
## 2024-03-11 ENCOUNTER — APPOINTMENT (OUTPATIENT)
Dept: OPHTHALMOLOGY | Facility: CLINIC | Age: 89
End: 2024-03-11
Payer: MEDICARE

## 2024-03-28 ENCOUNTER — PATIENT OUTREACH (OUTPATIENT)
Dept: PRIMARY CARE | Facility: CLINIC | Age: 89
End: 2024-03-28
Payer: MEDICARE

## 2024-04-01 ENCOUNTER — TELEPHONE (OUTPATIENT)
Dept: PRIMARY CARE | Facility: CLINIC | Age: 89
End: 2024-04-01
Payer: MEDICARE

## 2024-04-01 ENCOUNTER — PATIENT OUTREACH (OUTPATIENT)
Dept: PRIMARY CARE | Facility: CLINIC | Age: 89
End: 2024-04-01
Payer: MEDICARE

## 2024-04-01 NOTE — PROGRESS NOTES
"I received a call from the patients daughter Tami ([phone]) who stated that her mother is more depressed.  Per the daughter, the patient drinks 2 large glasses of wine a night, for the past 25 years, that contributes to her depression.  When Tami asks her what she is going to do today she responds, \"if I had a rope, I would hang myself.\"  Tami stated that her mother would never commit suicide.   Tami states that this her comment everyday.  Please advise.   "

## 2024-04-01 NOTE — PROGRESS NOTES
"I received a call from the patients daughter Tami ([phone]) who stated that her mother is more depressed.  Per the daughter, the patient drinks 2 large glasses of wine a night, for the past 25 years, that contributes to her depression.  When Tami asks her what she is going to do today she responds, \"if I had a rope, I would hang myself.\"  Tami stated that her mother would never commit suicide.   Tami states that this her comment everyday.  I sent this message to Dr Perez.  I spoke with Caitlyn from [facility] who recommended: [facility] [phone], [facility] [phone], and Suicide Prevention hot line 426.  Instructed with any questions or concerns.      "

## 2024-04-02 ENCOUNTER — TELEPHONE (OUTPATIENT)
Dept: PRIMARY CARE | Facility: CLINIC | Age: 89
End: 2024-04-02
Payer: MEDICARE

## 2024-04-02 ENCOUNTER — PATIENT OUTREACH (OUTPATIENT)
Dept: PRIMARY CARE | Facility: CLINIC | Age: 89
End: 2024-04-02
Payer: MEDICARE

## 2024-04-02 DIAGNOSIS — I10 BENIGN ESSENTIAL HYPERTENSION: ICD-10-CM

## 2024-04-02 DIAGNOSIS — M35.00 H/O SJOGREN'S DISEASE (MULTI): ICD-10-CM

## 2024-04-02 PROCEDURE — 99490 CHRNC CARE MGMT STAFF 1ST 20: CPT | Performed by: INTERNAL MEDICINE

## 2024-04-02 PROCEDURE — 99439 CHRNC CARE MGMT STAF EA ADDL: CPT | Performed by: INTERNAL MEDICINE

## 2024-04-02 NOTE — PROGRESS NOTES
"I received a call from the patients daughter Tami ([phone]) who stated that her mother is more depressed.  Per the daughter, the patient drinks 2 large glasses of wine a night, for the past 25 years, that contributes to her depression.  When Tami asks her what she is going to do today she responds, \"if I had a rope, I would hang myself.\"  Tami stated that her mother would never commit suicide.   Tami states that this her comment everyday.  I sent this message to Dr Perez.  I spoke with Caitlyn from [facility] who recommended: [facility] [phone], Mobile [facility] [phone], and Suicide Prevention hot line 109.  Instructed with any questions or concerns.   "

## 2024-04-02 NOTE — PROGRESS NOTES
"I received a call from the patients daughter Tami ([phone]) who stated that her mother is more depressed. Per the daughter, the patient drinks 2 large glasses of wine a night, for the past 25 years, that contributes to her depression. When Tami asks her what she is going to do today she responds, \"if I had a rope, I would hang myself.\" Tami stated that her mother would never commit suicide. Tami states that this her comment everyday. I sent this message to Dr Perez. I spoke with Caitlyn from [facility] who recommended: [facility] [phone], Mobile [facility] [phone], and Suicide Prevention hot line 772. Instructed with any questions or concerns.   "

## 2024-04-04 ENCOUNTER — TELEPHONE (OUTPATIENT)
Dept: PRIMARY CARE | Facility: CLINIC | Age: 89
End: 2024-04-04
Payer: MEDICARE

## 2024-04-04 NOTE — TELEPHONE ENCOUNTER
Pt's daughter called to see when Dr. Perez can see her mother. She is aware he is out of the office this week. She advised her mother is very depressed and needs an appt for some direction. Please call daughter and advise

## 2024-04-09 ENCOUNTER — OFFICE VISIT (OUTPATIENT)
Dept: PRIMARY CARE | Facility: CLINIC | Age: 89
End: 2024-04-09
Payer: MEDICARE

## 2024-04-09 VITALS
WEIGHT: 104 LBS | OXYGEN SATURATION: 97 % | HEIGHT: 60 IN | BODY MASS INDEX: 20.42 KG/M2 | HEART RATE: 81 BPM | TEMPERATURE: 97.3 F | DIASTOLIC BLOOD PRESSURE: 84 MMHG | RESPIRATION RATE: 16 BRPM | SYSTOLIC BLOOD PRESSURE: 136 MMHG

## 2024-04-09 DIAGNOSIS — R54 ADVANCED AGE: Chronic | ICD-10-CM

## 2024-04-09 DIAGNOSIS — M19.049 LOCALIZED, PRIMARY OSTEOARTHRITIS OF HAND, UNSPECIFIED LATERALITY: ICD-10-CM

## 2024-04-09 DIAGNOSIS — Z78.9 HEALTH CARE HOME, ACTIVE CARE COORDINATION: Chronic | ICD-10-CM

## 2024-04-09 DIAGNOSIS — M17.0 PRIMARY OSTEOARTHRITIS OF BOTH KNEES: ICD-10-CM

## 2024-04-09 DIAGNOSIS — I10 BENIGN ESSENTIAL HYPERTENSION: Primary | ICD-10-CM

## 2024-04-09 DIAGNOSIS — J30.2 SEASONAL ALLERGIES: ICD-10-CM

## 2024-04-09 DIAGNOSIS — M25.50 CHRONIC JOINT PAIN: Chronic | ICD-10-CM

## 2024-04-09 DIAGNOSIS — H90.3 BILATERAL SENSORINEURAL HEARING LOSS: ICD-10-CM

## 2024-04-09 DIAGNOSIS — E03.9 ACQUIRED HYPOTHYROIDISM: ICD-10-CM

## 2024-04-09 DIAGNOSIS — F43.21 SITUATIONAL DEPRESSION: ICD-10-CM

## 2024-04-09 DIAGNOSIS — G89.29 CHRONIC JOINT PAIN: Chronic | ICD-10-CM

## 2024-04-09 PROCEDURE — 3075F SYST BP GE 130 - 139MM HG: CPT | Performed by: INTERNAL MEDICINE

## 2024-04-09 PROCEDURE — G2211 COMPLEX E/M VISIT ADD ON: HCPCS | Performed by: INTERNAL MEDICINE

## 2024-04-09 PROCEDURE — 1157F ADVNC CARE PLAN IN RCRD: CPT | Performed by: INTERNAL MEDICINE

## 2024-04-09 PROCEDURE — 3079F DIAST BP 80-89 MM HG: CPT | Performed by: INTERNAL MEDICINE

## 2024-04-09 PROCEDURE — 1123F ACP DISCUSS/DSCN MKR DOCD: CPT | Performed by: INTERNAL MEDICINE

## 2024-04-09 PROCEDURE — 1160F RVW MEDS BY RX/DR IN RCRD: CPT | Performed by: INTERNAL MEDICINE

## 2024-04-09 PROCEDURE — 99215 OFFICE O/P EST HI 40 MIN: CPT | Performed by: INTERNAL MEDICINE

## 2024-04-09 PROCEDURE — 1159F MED LIST DOCD IN RCRD: CPT | Performed by: INTERNAL MEDICINE

## 2024-04-09 PROCEDURE — 1036F TOBACCO NON-USER: CPT | Performed by: INTERNAL MEDICINE

## 2024-04-09 ASSESSMENT — ENCOUNTER SYMPTOMS
DEPRESSION: 0
LOSS OF SENSATION IN FEET: 0
OCCASIONAL FEELINGS OF UNSTEADINESS: 1
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
## 2024-04-15 PROBLEM — G89.29 CHRONIC JOINT PAIN: Status: ACTIVE | Noted: 2024-04-15

## 2024-04-15 PROBLEM — M25.50 CHRONIC JOINT PAIN: Status: ACTIVE | Noted: 2024-04-15

## 2024-04-15 PROBLEM — F43.21 SITUATIONAL DEPRESSION: Status: ACTIVE | Noted: 2024-04-15

## 2024-04-15 PROBLEM — Z78.9 HEALTH CARE HOME, ACTIVE CARE COORDINATION: Status: ACTIVE | Noted: 2024-04-15

## 2024-05-14 ENCOUNTER — PATIENT OUTREACH (OUTPATIENT)
Dept: PRIMARY CARE | Facility: CLINIC | Age: 89
End: 2024-05-14
Payer: MEDICARE

## 2024-05-14 DIAGNOSIS — M17.0 PRIMARY OSTEOARTHRITIS OF BOTH KNEES: ICD-10-CM

## 2024-05-14 DIAGNOSIS — I10 BENIGN ESSENTIAL HYPERTENSION: ICD-10-CM

## 2024-05-14 PROCEDURE — 99490 CHRNC CARE MGMT STAFF 1ST 20: CPT | Performed by: INTERNAL MEDICINE

## 2024-05-14 NOTE — PROGRESS NOTES
I received a call back from the patient who stated that she is doing well.  Per the patient there is no change in her health since we last spoke.  No medication refills needed at this time.  Patient has a tooth pulled 2 weeks ago and is recovering well.  Instructed to call with any questions or concerns.

## 2024-05-21 ENCOUNTER — TELEPHONE (OUTPATIENT)
Dept: PRIMARY CARE | Facility: CLINIC | Age: 89
End: 2024-05-21
Payer: MEDICARE

## 2024-05-21 NOTE — TELEPHONE ENCOUNTER
"Tami, pt daughter, called wanted to leave message to Dr. Perez, she is \"Concerned about cognitive impairment on my mom's part\" Is there a method of checking this?    Please call Tami at    [phone]  " Yes...

## 2024-05-22 NOTE — TELEPHONE ENCOUNTER
Called-message left on daughter Tami as personal voicemail-that we can do the cognitive testing screening test at her July 17 appointment.  I asked her to call with any additional questions.

## 2024-05-28 ENCOUNTER — APPOINTMENT (OUTPATIENT)
Dept: PRIMARY CARE | Facility: CLINIC | Age: 89
End: 2024-05-28
Payer: MEDICARE

## 2024-05-30 ENCOUNTER — APPOINTMENT (OUTPATIENT)
Dept: PRIMARY CARE | Facility: CLINIC | Age: 89
End: 2024-05-30
Payer: MEDICARE

## 2024-06-04 ENCOUNTER — PATIENT OUTREACH (OUTPATIENT)
Dept: PRIMARY CARE | Facility: CLINIC | Age: 89
End: 2024-06-04
Payer: MEDICARE

## 2024-06-04 DIAGNOSIS — M35.00 H/O SJOGREN'S DISEASE (MULTI): ICD-10-CM

## 2024-06-04 DIAGNOSIS — I10 BENIGN ESSENTIAL HYPERTENSION: ICD-10-CM

## 2024-06-04 NOTE — PROGRESS NOTES
I received a call from the patient regarding her constipation and diarrhea. Per the patient they took Fiberall off the market.  I suggested taking Citrucel.  Patient will speak with the pharmacist at [facility] regarding other options also.  Medications reviewed with the patient and she understands her medications.  Patient has a follow up with Dr Perez next month.  Instructed to call with any questions or concerns.

## 2024-07-10 ENCOUNTER — APPOINTMENT (OUTPATIENT)
Dept: PRIMARY CARE | Facility: CLINIC | Age: 89
End: 2024-07-10
Payer: MEDICARE

## 2024-07-17 ENCOUNTER — APPOINTMENT (OUTPATIENT)
Dept: PRIMARY CARE | Facility: CLINIC | Age: 89
End: 2024-07-17
Payer: MEDICARE

## 2024-07-17 ENCOUNTER — OFFICE VISIT (OUTPATIENT)
Dept: PRIMARY CARE | Facility: CLINIC | Age: 89
End: 2024-07-17
Payer: MEDICARE

## 2024-07-17 VITALS
WEIGHT: 102.2 LBS | DIASTOLIC BLOOD PRESSURE: 75 MMHG | HEIGHT: 60 IN | SYSTOLIC BLOOD PRESSURE: 131 MMHG | RESPIRATION RATE: 16 BRPM | TEMPERATURE: 98 F | HEART RATE: 71 BPM | OXYGEN SATURATION: 97 % | BODY MASS INDEX: 20.07 KG/M2

## 2024-07-17 DIAGNOSIS — K59.00 CONSTIPATION, UNSPECIFIED CONSTIPATION TYPE: ICD-10-CM

## 2024-07-17 DIAGNOSIS — K59.09 CHRONIC CONSTIPATION: ICD-10-CM

## 2024-07-17 DIAGNOSIS — Z00.00 ROUTINE GENERAL MEDICAL EXAMINATION AT HEALTH CARE FACILITY: ICD-10-CM

## 2024-07-17 DIAGNOSIS — E53.8 VITAMIN B12 DEFICIENCY: ICD-10-CM

## 2024-07-17 DIAGNOSIS — E03.9 ACQUIRED HYPOTHYROIDISM: ICD-10-CM

## 2024-07-17 DIAGNOSIS — R54 ADVANCED AGE: ICD-10-CM

## 2024-07-17 DIAGNOSIS — M85.80 OSTEOPENIA, UNSPECIFIED LOCATION: ICD-10-CM

## 2024-07-17 DIAGNOSIS — I10 BENIGN ESSENTIAL HYPERTENSION: Primary | ICD-10-CM

## 2024-07-17 DIAGNOSIS — R26.81 UNSTEADY GAIT: ICD-10-CM

## 2024-07-17 DIAGNOSIS — K90.41 GLUTEN ENTEROPATHY: ICD-10-CM

## 2024-07-17 DIAGNOSIS — M35.00 H/O SJOGREN'S DISEASE (MULTI): ICD-10-CM

## 2024-07-17 PROCEDURE — 1157F ADVNC CARE PLAN IN RCRD: CPT | Performed by: INTERNAL MEDICINE

## 2024-07-17 PROCEDURE — G0439 PPPS, SUBSEQ VISIT: HCPCS | Performed by: INTERNAL MEDICINE

## 2024-07-17 PROCEDURE — 1160F RVW MEDS BY RX/DR IN RCRD: CPT | Performed by: INTERNAL MEDICINE

## 2024-07-17 PROCEDURE — 1159F MED LIST DOCD IN RCRD: CPT | Performed by: INTERNAL MEDICINE

## 2024-07-17 PROCEDURE — 3078F DIAST BP <80 MM HG: CPT | Performed by: INTERNAL MEDICINE

## 2024-07-17 PROCEDURE — 99397 PER PM REEVAL EST PAT 65+ YR: CPT | Performed by: INTERNAL MEDICINE

## 2024-07-17 PROCEDURE — 1036F TOBACCO NON-USER: CPT | Performed by: INTERNAL MEDICINE

## 2024-07-17 PROCEDURE — 1170F FXNL STATUS ASSESSED: CPT | Performed by: INTERNAL MEDICINE

## 2024-07-17 PROCEDURE — 3075F SYST BP GE 130 - 139MM HG: CPT | Performed by: INTERNAL MEDICINE

## 2024-07-17 PROCEDURE — 1123F ACP DISCUSS/DSCN MKR DOCD: CPT | Performed by: INTERNAL MEDICINE

## 2024-07-17 RX ORDER — DOCUSATE SODIUM 250 MG
1 CAPSULE ORAL DAILY
Qty: 90 CAPSULE | Refills: 3 | Status: SHIPPED | OUTPATIENT
Start: 2024-07-17

## 2024-07-17 RX ORDER — LISINOPRIL 10 MG/1
10 TABLET ORAL DAILY
Qty: 90 TABLET | Refills: 3 | Status: SHIPPED | OUTPATIENT
Start: 2024-07-17

## 2024-07-17 ASSESSMENT — ACTIVITIES OF DAILY LIVING (ADL)
DRESSING: INDEPENDENT
TAKING_MEDICATION: INDEPENDENT
MANAGING_FINANCES: INDEPENDENT
BATHING: INDEPENDENT
DOING_HOUSEWORK: TOTAL CARE
GROCERY_SHOPPING: INDEPENDENT
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
## 2024-07-18 ASSESSMENT — ACTIVITIES OF DAILY LIVING (ADL)
TOILETING: INDEPENDENT
PATIENT'S MEMORY ADEQUATE TO SAFELY COMPLETE DAILY ACTIVITIES?: YES
JUDGMENT_ADEQUATE_SAFELY_COMPLETE_DAILY_ACTIVITIES: YES
ADEQUATE_TO_COMPLETE_ADL: YES
FEEDING YOURSELF: INDEPENDENT
GROOMING: INDEPENDENT

## 2024-07-19 ENCOUNTER — PATIENT OUTREACH (OUTPATIENT)
Dept: PRIMARY CARE | Facility: CLINIC | Age: 89
End: 2024-07-19
Payer: MEDICARE

## 2024-07-19 DIAGNOSIS — I10 BENIGN ESSENTIAL HYPERTENSION: ICD-10-CM

## 2024-07-19 DIAGNOSIS — M35.00 H/O SJOGREN'S DISEASE (MULTI): ICD-10-CM

## 2024-07-19 NOTE — PROGRESS NOTES
I called and spoke with the patient who stated that she was doing well.  Per the patient she saw Dr Perez on 7/19/2024.  We review her appointment.  Patient is using her walker to get around and doing some balance exercises.  No falls per there patient.  Instructed to call with any questions or concerns.

## 2024-07-22 DIAGNOSIS — E03.9 ACQUIRED HYPOTHYROIDISM: ICD-10-CM

## 2024-07-22 RX ORDER — LEVOTHYROXINE SODIUM 50 UG/1
50 TABLET ORAL DAILY
Qty: 90 TABLET | Refills: 1 | Status: SHIPPED | OUTPATIENT
Start: 2024-07-22

## 2024-08-06 ENCOUNTER — PATIENT OUTREACH (OUTPATIENT)
Dept: PRIMARY CARE | Facility: CLINIC | Age: 89
End: 2024-08-06
Payer: MEDICARE

## 2024-08-06 DIAGNOSIS — I10 BENIGN ESSENTIAL HYPERTENSION: ICD-10-CM

## 2024-08-06 DIAGNOSIS — M35.00 H/O SJOGREN'S DISEASE (MULTI): ICD-10-CM

## 2024-08-06 NOTE — PROGRESS NOTES
I spoke with the patient who stated that she was under the care of another physician and will no longer be seeing Dr Perez.

## 2024-10-01 ENCOUNTER — APPOINTMENT (OUTPATIENT)
Dept: PRIMARY CARE | Facility: CLINIC | Age: 89
End: 2024-10-01
Payer: MEDICARE

## 2024-10-31 ENCOUNTER — APPOINTMENT (OUTPATIENT)
Dept: AUDIOLOGY | Facility: CLINIC | Age: 89
End: 2024-10-31
Payer: MEDICARE

## 2024-11-15 ENCOUNTER — APPOINTMENT (OUTPATIENT)
Dept: PRIMARY CARE | Facility: CLINIC | Age: 89
End: 2024-11-15
Payer: MEDICARE

## 2024-11-15 ENCOUNTER — LAB (OUTPATIENT)
Dept: LAB | Facility: LAB | Age: 89
End: 2024-11-15
Payer: MEDICARE

## 2024-11-15 VITALS
HEART RATE: 70 BPM | TEMPERATURE: 97.3 F | HEIGHT: 60 IN | DIASTOLIC BLOOD PRESSURE: 69 MMHG | BODY MASS INDEX: 19.96 KG/M2 | OXYGEN SATURATION: 97 % | SYSTOLIC BLOOD PRESSURE: 118 MMHG

## 2024-11-15 DIAGNOSIS — E87.1 HYPONATREMIA: ICD-10-CM

## 2024-11-15 DIAGNOSIS — E78.5 DYSLIPIDEMIA: ICD-10-CM

## 2024-11-15 DIAGNOSIS — M17.12 PRIMARY OSTEOARTHRITIS OF LEFT KNEE: ICD-10-CM

## 2024-11-15 DIAGNOSIS — E55.9 VITAMIN D DEFICIENCY: Primary | ICD-10-CM

## 2024-11-15 DIAGNOSIS — K90.41 GLUTEN ENTEROPATHY: ICD-10-CM

## 2024-11-15 DIAGNOSIS — E53.8 VITAMIN B12 DEFICIENCY: ICD-10-CM

## 2024-11-15 DIAGNOSIS — Z00.00 ROUTINE GENERAL MEDICAL EXAMINATION AT HEALTH CARE FACILITY: ICD-10-CM

## 2024-11-15 DIAGNOSIS — E55.9 VITAMIN D DEFICIENCY: ICD-10-CM

## 2024-11-15 DIAGNOSIS — I10 BENIGN ESSENTIAL HYPERTENSION: ICD-10-CM

## 2024-11-15 DIAGNOSIS — E03.9 ACQUIRED HYPOTHYROIDISM: ICD-10-CM

## 2024-11-15 PROCEDURE — G2211 COMPLEX E/M VISIT ADD ON: HCPCS | Performed by: INTERNAL MEDICINE

## 2024-11-15 PROCEDURE — 84443 ASSAY THYROID STIM HORMONE: CPT

## 2024-11-15 PROCEDURE — 84439 ASSAY OF FREE THYROXINE: CPT

## 2024-11-15 PROCEDURE — 99214 OFFICE O/P EST MOD 30 MIN: CPT | Performed by: INTERNAL MEDICINE

## 2024-11-15 PROCEDURE — 80048 BASIC METABOLIC PNL TOTAL CA: CPT

## 2024-11-15 PROCEDURE — 3078F DIAST BP <80 MM HG: CPT | Performed by: INTERNAL MEDICINE

## 2024-11-15 PROCEDURE — 1123F ACP DISCUSS/DSCN MKR DOCD: CPT | Performed by: INTERNAL MEDICINE

## 2024-11-15 PROCEDURE — 3074F SYST BP LT 130 MM HG: CPT | Performed by: INTERNAL MEDICINE

## 2024-11-15 PROCEDURE — 82746 ASSAY OF FOLIC ACID SERUM: CPT

## 2024-11-15 PROCEDURE — 36415 COLL VENOUS BLD VENIPUNCTURE: CPT

## 2024-11-15 PROCEDURE — 1159F MED LIST DOCD IN RCRD: CPT | Performed by: INTERNAL MEDICINE

## 2024-11-15 PROCEDURE — 82607 VITAMIN B-12: CPT

## 2024-11-15 PROCEDURE — 1160F RVW MEDS BY RX/DR IN RCRD: CPT | Performed by: INTERNAL MEDICINE

## 2024-11-15 PROCEDURE — 82306 VITAMIN D 25 HYDROXY: CPT

## 2024-11-15 PROCEDURE — 85027 COMPLETE CBC AUTOMATED: CPT

## 2024-11-15 PROCEDURE — 84460 ALANINE AMINO (ALT) (SGPT): CPT

## 2024-11-15 PROCEDURE — 1157F ADVNC CARE PLAN IN RCRD: CPT | Performed by: INTERNAL MEDICINE
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
## 2024-11-16 LAB
25(OH)D3 SERPL-MCNC: 60 NG/ML (ref 30–100)
ALT SERPL W P-5'-P-CCNC: 14 U/L (ref 7–45)
ANION GAP SERPL CALC-SCNC: 12 MMOL/L (ref 10–20)
BUN SERPL-MCNC: 27 MG/DL (ref 6–23)
CALCIUM SERPL-MCNC: 9.2 MG/DL (ref 8.6–10.6)
CHLORIDE SERPL-SCNC: 104 MMOL/L (ref 98–107)
CO2 SERPL-SCNC: 26 MMOL/L (ref 21–32)
CREAT SERPL-MCNC: 0.67 MG/DL (ref 0.5–1.05)
EGFRCR SERPLBLD CKD-EPI 2021: 80 ML/MIN/1.73M*2
ERYTHROCYTE [DISTWIDTH] IN BLOOD BY AUTOMATED COUNT: 15.3 % (ref 11.5–14.5)
FOLATE SERPL-MCNC: >24 NG/ML
GLUCOSE SERPL-MCNC: 62 MG/DL (ref 74–99)
HCT VFR BLD AUTO: 38.3 % (ref 36–46)
HGB BLD-MCNC: 12.6 G/DL (ref 12–16)
MCH RBC QN AUTO: 34.3 PG (ref 26–34)
MCHC RBC AUTO-ENTMCNC: 32.9 G/DL (ref 32–36)
MCV RBC AUTO: 104 FL (ref 80–100)
NRBC BLD-RTO: 0 /100 WBCS (ref 0–0)
PLATELET # BLD AUTO: 297 X10*3/UL (ref 150–450)
POTASSIUM SERPL-SCNC: 4.3 MMOL/L (ref 3.5–5.3)
RBC # BLD AUTO: 3.67 X10*6/UL (ref 4–5.2)
SODIUM SERPL-SCNC: 138 MMOL/L (ref 136–145)
T4 FREE SERPL-MCNC: 1.14 NG/DL (ref 0.78–1.48)
TSH SERPL-ACNC: 9.46 MIU/L (ref 0.44–3.98)
VIT B12 SERPL-MCNC: >2000 PG/ML (ref 211–911)
WBC # BLD AUTO: 6.5 X10*3/UL (ref 4.4–11.3)

## 2024-11-16 NOTE — RESULT ENCOUNTER NOTE
Tami -thanks for helping to get your mom in yesterday, and for doing the labs.  The labs essentially look stable without worrisome findings.  Though the TSH was slightly elevated, the free thyroxine index was normal which means she is on sufficient thyroid replacement at this time.  The B12 level was slightly high.  If she is taking extra B12 you might either reduce the daily dose or have her take this every other day.  The kidney, liver and electrolyte labs look fine and the complete blood count shows no signs of anemia.  Her vitamin D level looks fine as well.    Wishing you the very best with her moved to the [facility].  It honestly has been a great fit for and a lot of my seniors.  Hopefully she will be able to transition into the activities there.  Please reach out and let me know if there is anything I can do to help.    All the best    Derrick Perez MD

## 2025-01-06 DIAGNOSIS — E03.9 ACQUIRED HYPOTHYROIDISM: ICD-10-CM

## 2025-01-06 RX ORDER — LEVOTHYROXINE SODIUM 50 UG/1
50 TABLET ORAL DAILY
Qty: 90 TABLET | Refills: 1 | Status: SHIPPED | OUTPATIENT
Start: 2025-01-06

## 2025-03-19 ENCOUNTER — APPOINTMENT (OUTPATIENT)
Dept: PRIMARY CARE | Facility: CLINIC | Age: OVER 89
End: 2025-03-19
Payer: MEDICARE

## 2025-03-19 VITALS — HEIGHT: 60 IN | WEIGHT: 93.6 LBS | BODY MASS INDEX: 18.38 KG/M2

## 2025-03-19 DIAGNOSIS — H90.3 BILATERAL SENSORINEURAL HEARING LOSS: ICD-10-CM

## 2025-03-19 DIAGNOSIS — I10 BENIGN ESSENTIAL HYPERTENSION: Primary | ICD-10-CM

## 2025-03-19 DIAGNOSIS — Z78.9 DECREASED INDEPENDENCE WITH ACTIVITIES OF DAILY LIVING: ICD-10-CM

## 2025-03-19 DIAGNOSIS — Z78.9 LIVES IN ASSISTED LIVING FACILITY: ICD-10-CM

## 2025-03-19 DIAGNOSIS — Z85.828 PERSONAL HISTORY OF OTHER MALIGNANT NEOPLASM OF SKIN: ICD-10-CM

## 2025-03-19 DIAGNOSIS — M17.12 PRIMARY OSTEOARTHRITIS OF LEFT KNEE: ICD-10-CM

## 2025-03-19 DIAGNOSIS — J30.2 SEASONAL ALLERGIES: ICD-10-CM

## 2025-03-19 DIAGNOSIS — K90.41 GLUTEN ENTEROPATHY: ICD-10-CM

## 2025-03-19 DIAGNOSIS — R54 ADVANCED AGE: ICD-10-CM

## 2025-03-19 DIAGNOSIS — K90.41 GLUTEN INTOLERANCE: ICD-10-CM

## 2025-03-19 DIAGNOSIS — R63.8 CONCERN ABOUT FOOD OR NUTRITION: ICD-10-CM

## 2025-03-19 DIAGNOSIS — F43.21 SITUATIONAL DEPRESSION: ICD-10-CM

## 2025-03-19 DIAGNOSIS — E53.8 VITAMIN B12 DEFICIENCY: ICD-10-CM

## 2025-03-19 DIAGNOSIS — M16.12 PRIMARY LOCALIZED OSTEOARTHRITIS OF LEFT HIP: ICD-10-CM

## 2025-03-19 DIAGNOSIS — R26.81 UNSTEADY GAIT: ICD-10-CM

## 2025-03-19 DIAGNOSIS — M19.049 LOCALIZED, PRIMARY OSTEOARTHRITIS OF HAND, UNSPECIFIED LATERALITY: ICD-10-CM

## 2025-03-19 DIAGNOSIS — E03.9 ACQUIRED HYPOTHYROIDISM: ICD-10-CM

## 2025-03-19 PROCEDURE — 1159F MED LIST DOCD IN RCRD: CPT | Performed by: INTERNAL MEDICINE

## 2025-03-19 PROCEDURE — 99215 OFFICE O/P EST HI 40 MIN: CPT | Performed by: INTERNAL MEDICINE

## 2025-03-19 PROCEDURE — G2211 COMPLEX E/M VISIT ADD ON: HCPCS | Performed by: INTERNAL MEDICINE

## 2025-03-19 PROCEDURE — 1160F RVW MEDS BY RX/DR IN RCRD: CPT | Performed by: INTERNAL MEDICINE

## 2025-03-19 PROCEDURE — 1036F TOBACCO NON-USER: CPT | Performed by: INTERNAL MEDICINE

## 2025-03-19 PROCEDURE — 1123F ACP DISCUSS/DSCN MKR DOCD: CPT | Performed by: INTERNAL MEDICINE

## 2025-03-19 PROCEDURE — 1157F ADVNC CARE PLAN IN RCRD: CPT | Performed by: INTERNAL MEDICINE
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
## 2025-03-20 PROBLEM — R68.83 CHILLS (WITHOUT FEVER): Status: RESOLVED | Noted: 2023-02-17 | Resolved: 2025-03-20

## 2025-03-20 PROBLEM — Z78.9 DECREASED INDEPENDENCE WITH ACTIVITIES OF DAILY LIVING: Status: ACTIVE | Noted: 2025-03-20

## 2025-03-20 PROBLEM — K90.41 GLUTEN INTOLERANCE: Status: ACTIVE | Noted: 2025-03-20

## 2025-03-20 PROBLEM — N39.0 ACUTE UTI: Status: RESOLVED | Noted: 2023-02-17 | Resolved: 2025-03-20

## 2025-03-20 PROBLEM — Z78.9 LIVES IN ASSISTED LIVING FACILITY: Status: ACTIVE | Noted: 2025-03-20

## 2025-03-20 PROBLEM — R63.8 CONCERN ABOUT FOOD OR NUTRITION: Status: ACTIVE | Noted: 2025-03-20

## 2025-06-11 DIAGNOSIS — E03.9 ACQUIRED HYPOTHYROIDISM: ICD-10-CM

## 2025-06-11 RX ORDER — LEVOTHYROXINE SODIUM 50 UG/1
50 TABLET ORAL DAILY
Qty: 90 TABLET | Refills: 1 | Status: SHIPPED | OUTPATIENT
Start: 2025-06-11

## 2025-07-07 ENCOUNTER — APPOINTMENT (OUTPATIENT)
Dept: PRIMARY CARE | Facility: CLINIC | Age: OVER 89
End: 2025-07-07
Payer: MEDICARE

## 2025-08-03 ENCOUNTER — APPOINTMENT (OUTPATIENT)
Dept: RADIOLOGY | Facility: HOSPITAL | Age: OVER 89
End: 2025-08-03
Payer: MEDICARE

## 2025-08-03 ENCOUNTER — HOSPITAL ENCOUNTER (INPATIENT)
Facility: HOSPITAL | Age: OVER 89
End: 2025-08-03
Attending: EMERGENCY MEDICINE | Admitting: INTERNAL MEDICINE
Payer: MEDICARE

## 2025-08-03 ENCOUNTER — APPOINTMENT (OUTPATIENT)
Dept: CARDIOLOGY | Facility: HOSPITAL | Age: OVER 89
End: 2025-08-03
Payer: MEDICARE
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
## 2025-08-03 DIAGNOSIS — S06.5XAA SUBDURAL HEMATOMA (MULTI): ICD-10-CM

## 2025-08-03 LAB
ALBUMIN SERPL BCP-MCNC: 4.1 G/DL (ref 3.4–5)
ALP SERPL-CCNC: 59 U/L (ref 33–136)
ALT SERPL W P-5'-P-CCNC: 23 U/L (ref 7–45)
ANION GAP SERPL CALC-SCNC: 15 MMOL/L (ref 10–20)
APPEARANCE UR: CLEAR
AST SERPL W P-5'-P-CCNC: 33 U/L (ref 9–39)
BASOPHILS # BLD AUTO: 0.03 X10*3/UL (ref 0–0.1)
BASOPHILS NFR BLD AUTO: 0.2 %
BILIRUB SERPL-MCNC: 1.1 MG/DL (ref 0–1.2)
BILIRUB UR STRIP.AUTO-MCNC: NEGATIVE MG/DL
BUN SERPL-MCNC: 37 MG/DL (ref 6–23)
CALCIUM SERPL-MCNC: 9.5 MG/DL (ref 8.6–10.3)
CARDIAC TROPONIN I PNL SERPL HS: 12 NG/L (ref 0–13)
CARDIAC TROPONIN I PNL SERPL HS: 13 NG/L (ref 0–13)
CHLORIDE SERPL-SCNC: 104 MMOL/L (ref 98–107)
CO2 SERPL-SCNC: 22 MMOL/L (ref 21–32)
COLOR UR: ABNORMAL
CREAT SERPL-MCNC: 0.67 MG/DL (ref 0.5–1.05)
EGFRCR SERPLBLD CKD-EPI 2021: 80 ML/MIN/1.73M*2
EOSINOPHIL # BLD AUTO: 0 X10*3/UL (ref 0–0.4)
EOSINOPHIL NFR BLD AUTO: 0 %
ERYTHROCYTE [DISTWIDTH] IN BLOOD BY AUTOMATED COUNT: 15.5 % (ref 11.5–14.5)
GLUCOSE SERPL-MCNC: 122 MG/DL (ref 74–99)
GLUCOSE UR STRIP.AUTO-MCNC: NORMAL MG/DL
HCT VFR BLD AUTO: 37.1 % (ref 36–46)
HGB BLD-MCNC: 12.7 G/DL (ref 12–16)
HOLD SPECIMEN: NORMAL
HOLD SPECIMEN: NORMAL
IMM GRANULOCYTES # BLD AUTO: 0.05 X10*3/UL (ref 0–0.5)
IMM GRANULOCYTES NFR BLD AUTO: 0.4 % (ref 0–0.9)
KETONES UR STRIP.AUTO-MCNC: ABNORMAL MG/DL
LEUKOCYTE ESTERASE UR QL STRIP.AUTO: NEGATIVE
LYMPHOCYTES # BLD AUTO: 0.62 X10*3/UL (ref 0.8–3)
LYMPHOCYTES NFR BLD AUTO: 4.6 %
MAGNESIUM SERPL-MCNC: 2.37 MG/DL (ref 1.6–2.4)
MCH RBC QN AUTO: 33.9 PG (ref 26–34)
MCHC RBC AUTO-ENTMCNC: 34.2 G/DL (ref 32–36)
MCV RBC AUTO: 99 FL (ref 80–100)
MONOCYTES # BLD AUTO: 1.24 X10*3/UL (ref 0.05–0.8)
MONOCYTES NFR BLD AUTO: 9.1 %
NEUTROPHILS # BLD AUTO: 11.68 X10*3/UL (ref 1.6–5.5)
NEUTROPHILS NFR BLD AUTO: 85.7 %
NITRITE UR QL STRIP.AUTO: NEGATIVE
NRBC BLD-RTO: 0 /100 WBCS (ref 0–0)
PH UR STRIP.AUTO: 6.5 [PH]
PLATELET # BLD AUTO: 174 X10*3/UL (ref 150–450)
POTASSIUM SERPL-SCNC: 4.7 MMOL/L (ref 3.5–5.3)
PROT SERPL-MCNC: 7.3 G/DL (ref 6.4–8.2)
PROT UR STRIP.AUTO-MCNC: NEGATIVE MG/DL
RBC # BLD AUTO: 3.75 X10*6/UL (ref 4–5.2)
RBC # UR STRIP.AUTO: NEGATIVE MG/DL
SODIUM SERPL-SCNC: 136 MMOL/L (ref 136–145)
SP GR UR STRIP.AUTO: 1.02
UROBILINOGEN UR STRIP.AUTO-MCNC: NORMAL MG/DL
WBC # BLD AUTO: 13.6 X10*3/UL (ref 4.4–11.3)

## 2025-08-03 PROCEDURE — 99291 CRITICAL CARE FIRST HOUR: CPT | Performed by: EMERGENCY MEDICINE

## 2025-08-03 PROCEDURE — 73590 X-RAY EXAM OF LOWER LEG: CPT | Mod: 50

## 2025-08-03 PROCEDURE — 84484 ASSAY OF TROPONIN QUANT: CPT

## 2025-08-03 PROCEDURE — 99223 1ST HOSP IP/OBS HIGH 75: CPT | Performed by: INTERNAL MEDICINE

## 2025-08-03 PROCEDURE — 70486 CT MAXILLOFACIAL W/O DYE: CPT | Performed by: STUDENT IN AN ORGANIZED HEALTH CARE EDUCATION/TRAINING PROGRAM

## 2025-08-03 PROCEDURE — 99285 EMERGENCY DEPT VISIT HI MDM: CPT | Mod: 25 | Performed by: EMERGENCY MEDICINE

## 2025-08-03 PROCEDURE — 72125 CT NECK SPINE W/O DYE: CPT

## 2025-08-03 PROCEDURE — 72125 CT NECK SPINE W/O DYE: CPT | Performed by: STUDENT IN AN ORGANIZED HEALTH CARE EDUCATION/TRAINING PROGRAM

## 2025-08-03 PROCEDURE — 73590 X-RAY EXAM OF LOWER LEG: CPT | Mod: BILATERAL PROCEDURE | Performed by: STUDENT IN AN ORGANIZED HEALTH CARE EDUCATION/TRAINING PROGRAM

## 2025-08-03 PROCEDURE — 83735 ASSAY OF MAGNESIUM: CPT

## 2025-08-03 PROCEDURE — 76377 3D RENDER W/INTRP POSTPROCES: CPT | Performed by: STUDENT IN AN ORGANIZED HEALTH CARE EDUCATION/TRAINING PROGRAM

## 2025-08-03 PROCEDURE — 71045 X-RAY EXAM CHEST 1 VIEW: CPT

## 2025-08-03 PROCEDURE — 76377 3D RENDER W/INTRP POSTPROCES: CPT

## 2025-08-03 PROCEDURE — 70450 CT HEAD/BRAIN W/O DYE: CPT | Performed by: STUDENT IN AN ORGANIZED HEALTH CARE EDUCATION/TRAINING PROGRAM

## 2025-08-03 PROCEDURE — 80053 COMPREHEN METABOLIC PANEL: CPT

## 2025-08-03 PROCEDURE — 72170 X-RAY EXAM OF PELVIS: CPT | Performed by: STUDENT IN AN ORGANIZED HEALTH CARE EDUCATION/TRAINING PROGRAM

## 2025-08-03 PROCEDURE — G0390 TRAUMA RESPONS W/HOSP CRITI: HCPCS

## 2025-08-03 PROCEDURE — 72170 X-RAY EXAM OF PELVIS: CPT

## 2025-08-03 PROCEDURE — 36415 COLL VENOUS BLD VENIPUNCTURE: CPT

## 2025-08-03 PROCEDURE — 93005 ELECTROCARDIOGRAM TRACING: CPT

## 2025-08-03 PROCEDURE — 81003 URINALYSIS AUTO W/O SCOPE: CPT

## 2025-08-03 PROCEDURE — 2060000001 HC INTERMEDIATE ICU ROOM DAILY

## 2025-08-03 PROCEDURE — 70450 CT HEAD/BRAIN W/O DYE: CPT

## 2025-08-03 PROCEDURE — 2500000004 HC RX 250 GENERAL PHARMACY W/ HCPCS (ALT 636 FOR OP/ED): Performed by: INTERNAL MEDICINE

## 2025-08-03 PROCEDURE — 2500000001 HC RX 250 WO HCPCS SELF ADMINISTERED DRUGS (ALT 637 FOR MEDICARE OP): Performed by: EMERGENCY MEDICINE

## 2025-08-03 PROCEDURE — 85025 COMPLETE CBC W/AUTO DIFF WBC: CPT

## 2025-08-03 PROCEDURE — 70450 CT HEAD/BRAIN W/O DYE: CPT | Performed by: RADIOLOGY

## 2025-08-03 PROCEDURE — 71045 X-RAY EXAM CHEST 1 VIEW: CPT | Performed by: STUDENT IN AN ORGANIZED HEALTH CARE EDUCATION/TRAINING PROGRAM

## 2025-08-03 PROCEDURE — 70486 CT MAXILLOFACIAL W/O DYE: CPT

## 2025-08-03 RX ORDER — ACETAMINOPHEN 325 MG/1
975 TABLET ORAL EVERY 8 HOURS PRN
Status: DISCONTINUED | OUTPATIENT
Start: 2025-08-03 | End: 2025-08-05 | Stop reason: HOSPADM

## 2025-08-03 RX ORDER — FLAXSEED OIL 1000 MG
1000 CAPSULE ORAL DAILY
COMMUNITY

## 2025-08-03 RX ORDER — ONDANSETRON HYDROCHLORIDE 2 MG/ML
4 INJECTION, SOLUTION INTRAVENOUS EVERY 6 HOURS PRN
Status: DISCONTINUED | OUTPATIENT
Start: 2025-08-03 | End: 2025-08-05 | Stop reason: HOSPADM

## 2025-08-03 RX ORDER — SPIRONOLACTONE 25 MG
1 TABLET ORAL DAILY
COMMUNITY

## 2025-08-03 RX ORDER — HYDRALAZINE HYDROCHLORIDE 20 MG/ML
10 INJECTION INTRAMUSCULAR; INTRAVENOUS EVERY 4 HOURS PRN
Status: DISCONTINUED | OUTPATIENT
Start: 2025-08-03 | End: 2025-08-05 | Stop reason: HOSPADM

## 2025-08-03 RX ORDER — MULTIVIT-MIN/IRON FUM/FOLIC AC 7.5 MG-4
1 TABLET ORAL DAILY
COMMUNITY

## 2025-08-03 RX ORDER — ACETAMINOPHEN 325 MG/1
975 TABLET ORAL ONCE
Status: COMPLETED | OUTPATIENT
Start: 2025-08-03 | End: 2025-08-03

## 2025-08-03 RX ORDER — TALC
9 POWDER (GRAM) TOPICAL NIGHTLY PRN
Status: DISCONTINUED | OUTPATIENT
Start: 2025-08-03 | End: 2025-08-05 | Stop reason: HOSPADM

## 2025-08-03 RX ADMIN — HYDRALAZINE HYDROCHLORIDE 10 MG: 20 INJECTION INTRAMUSCULAR; INTRAVENOUS at 20:58

## 2025-08-03 RX ADMIN — ACETAMINOPHEN 975 MG: 325 TABLET ORAL at 16:26

## 2025-08-03 SDOH — SOCIAL STABILITY: SOCIAL INSECURITY: HAS ANYONE EVER THREATENED TO HURT YOUR FAMILY OR YOUR PETS?: NO

## 2025-08-03 SDOH — SOCIAL STABILITY: SOCIAL INSECURITY: DO YOU FEEL UNSAFE GOING BACK TO THE PLACE WHERE YOU ARE LIVING?: NO

## 2025-08-03 SDOH — ECONOMIC STABILITY: FOOD INSECURITY: WITHIN THE PAST 12 MONTHS, THE FOOD YOU BOUGHT JUST DIDN'T LAST AND YOU DIDN'T HAVE MONEY TO GET MORE.: NEVER TRUE

## 2025-08-03 SDOH — SOCIAL STABILITY: SOCIAL INSECURITY
WITHIN THE LAST YEAR, HAVE YOU BEEN RAPED OR FORCED TO HAVE ANY KIND OF SEXUAL ACTIVITY BY YOUR PARTNER OR EX-PARTNER?: NO

## 2025-08-03 SDOH — SOCIAL STABILITY: SOCIAL INSECURITY
WITHIN THE LAST YEAR, HAVE YOU BEEN KICKED, HIT, SLAPPED, OR OTHERWISE PHYSICALLY HURT BY YOUR PARTNER OR EX-PARTNER?: NO

## 2025-08-03 SDOH — SOCIAL STABILITY: SOCIAL INSECURITY: WERE YOU ABLE TO COMPLETE ALL THE BEHAVIORAL HEALTH SCREENINGS?: YES

## 2025-08-03 SDOH — SOCIAL STABILITY: SOCIAL INSECURITY: ARE YOU OR HAVE YOU BEEN THREATENED OR ABUSED PHYSICALLY, EMOTIONALLY, OR SEXUALLY BY ANYONE?: NO

## 2025-08-03 SDOH — SOCIAL STABILITY: SOCIAL INSECURITY: WITHIN THE LAST YEAR, HAVE YOU BEEN HUMILIATED OR EMOTIONALLY ABUSED IN OTHER WAYS BY YOUR PARTNER OR EX-PARTNER?: NO

## 2025-08-03 SDOH — ECONOMIC STABILITY: HOUSING INSECURITY: IN THE PAST 12 MONTHS, HOW MANY TIMES HAVE YOU MOVED WHERE YOU WERE LIVING?: 1

## 2025-08-03 SDOH — SOCIAL STABILITY: SOCIAL INSECURITY: DO YOU FEEL ANYONE HAS EXPLOITED OR TAKEN ADVANTAGE OF YOU FINANCIALLY OR OF YOUR PERSONAL PROPERTY?: NO

## 2025-08-03 SDOH — ECONOMIC STABILITY: INCOME INSECURITY: IN THE PAST 12 MONTHS HAS THE ELECTRIC, GAS, OIL, OR WATER COMPANY THREATENED TO SHUT OFF SERVICES IN YOUR HOME?: NO

## 2025-08-03 SDOH — ECONOMIC STABILITY: HOUSING INSECURITY: IN THE LAST 12 MONTHS, WAS THERE A TIME WHEN YOU WERE NOT ABLE TO PAY THE MORTGAGE OR RENT ON TIME?: NO

## 2025-08-03 SDOH — SOCIAL STABILITY: SOCIAL INSECURITY: WITHIN THE LAST YEAR, HAVE YOU BEEN AFRAID OF YOUR PARTNER OR EX-PARTNER?: NO

## 2025-08-03 SDOH — SOCIAL STABILITY: SOCIAL INSECURITY: DOES ANYONE TRY TO KEEP YOU FROM HAVING/CONTACTING OTHER FRIENDS OR DOING THINGS OUTSIDE YOUR HOME?: NO

## 2025-08-03 SDOH — ECONOMIC STABILITY: FOOD INSECURITY: HOW HARD IS IT FOR YOU TO PAY FOR THE VERY BASICS LIKE FOOD, HOUSING, MEDICAL CARE, AND HEATING?: NOT HARD AT ALL

## 2025-08-03 SDOH — ECONOMIC STABILITY: TRANSPORTATION INSECURITY: IN THE PAST 12 MONTHS, HAS LACK OF TRANSPORTATION KEPT YOU FROM MEDICAL APPOINTMENTS OR FROM GETTING MEDICATIONS?: NO

## 2025-08-03 SDOH — ECONOMIC STABILITY: HOUSING INSECURITY: AT ANY TIME IN THE PAST 12 MONTHS, WERE YOU HOMELESS OR LIVING IN A SHELTER (INCLUDING NOW)?: NO

## 2025-08-03 SDOH — ECONOMIC STABILITY: FOOD INSECURITY: WITHIN THE PAST 12 MONTHS, YOU WORRIED THAT YOUR FOOD WOULD RUN OUT BEFORE YOU GOT THE MONEY TO BUY MORE.: NEVER TRUE

## 2025-08-03 SDOH — SOCIAL STABILITY: SOCIAL INSECURITY: ABUSE: ADULT

## 2025-08-03 SDOH — SOCIAL STABILITY: SOCIAL INSECURITY: ARE THERE ANY APPARENT SIGNS OF INJURIES/BEHAVIORS THAT COULD BE RELATED TO ABUSE/NEGLECT?: NO

## 2025-08-03 SDOH — SOCIAL STABILITY: SOCIAL INSECURITY: HAVE YOU HAD THOUGHTS OF HARMING ANYONE ELSE?: NO

## 2025-08-03 SDOH — SOCIAL STABILITY: SOCIAL INSECURITY: HAVE YOU HAD ANY THOUGHTS OF HARMING ANYONE ELSE?: NO

## 2025-08-03 ASSESSMENT — LIFESTYLE VARIABLES
HOW OFTEN DO YOU HAVE 6 OR MORE DRINKS ON ONE OCCASION: NEVER
AUDIT-C TOTAL SCORE: 0
AUDIT-C TOTAL SCORE: 0
SKIP TO QUESTIONS 9-10: 1
HOW OFTEN DO YOU HAVE A DRINK CONTAINING ALCOHOL: NEVER
EVER FELT BAD OR GUILTY ABOUT YOUR DRINKING: NO
TOTAL SCORE: 0
HAVE YOU EVER FELT YOU SHOULD CUT DOWN ON YOUR DRINKING: NO
HOW MANY STANDARD DRINKS CONTAINING ALCOHOL DO YOU HAVE ON A TYPICAL DAY: PATIENT DOES NOT DRINK
HAVE PEOPLE ANNOYED YOU BY CRITICIZING YOUR DRINKING: NO
EVER HAD A DRINK FIRST THING IN THE MORNING TO STEADY YOUR NERVES TO GET RID OF A HANGOVER: NO

## 2025-08-03 ASSESSMENT — PAIN - FUNCTIONAL ASSESSMENT
PAIN_FUNCTIONAL_ASSESSMENT: 0-10

## 2025-08-03 ASSESSMENT — PAIN SCALES - GENERAL
PAINLEVEL_OUTOF10: 7
PAINLEVEL_OUTOF10: 8
PAINLEVEL_OUTOF10: 0 - NO PAIN
PAINLEVEL_OUTOF10: 7
PAINLEVEL_OUTOF10: 10 - WORST POSSIBLE PAIN
PAINLEVEL_OUTOF10: 5 - MODERATE PAIN
PAINLEVEL_OUTOF10: 0 - NO PAIN

## 2025-08-03 ASSESSMENT — COGNITIVE AND FUNCTIONAL STATUS - GENERAL
MOBILITY SCORE: 24
DAILY ACTIVITIY SCORE: 24
PATIENT BASELINE BEDBOUND: NO

## 2025-08-03 ASSESSMENT — PAIN DESCRIPTION - LOCATION: LOCATION: HEAD

## 2025-08-03 ASSESSMENT — TONOMETRY
OS_IOP_MMHG: 12
IOP_HANDHELD: 1
OD_IOP_MMHG: 12

## 2025-08-03 ASSESSMENT — ACTIVITIES OF DAILY LIVING (ADL)
FEEDING YOURSELF: INDEPENDENT
ASSISTIVE_DEVICE: WALKER
TOILETING: INDEPENDENT
LACK_OF_TRANSPORTATION: NO
DRESSING YOURSELF: INDEPENDENT
BATHING: INDEPENDENT
JUDGMENT_ADEQUATE_SAFELY_COMPLETE_DAILY_ACTIVITIES: YES
HEARING - LEFT EAR: HEARING AID
PATIENT'S MEMORY ADEQUATE TO SAFELY COMPLETE DAILY ACTIVITIES?: YES
LACK_OF_TRANSPORTATION: NO
HEARING - RIGHT EAR: HEARING AID
ADEQUATE_TO_COMPLETE_ADL: YES
GROOMING: INDEPENDENT
WALKS IN HOME: INDEPENDENT

## 2025-08-03 ASSESSMENT — PATIENT HEALTH QUESTIONNAIRE - PHQ9
SUM OF ALL RESPONSES TO PHQ9 QUESTIONS 1 & 2: 2
1. LITTLE INTEREST OR PLEASURE IN DOING THINGS: SEVERAL DAYS
2. FEELING DOWN, DEPRESSED OR HOPELESS: SEVERAL DAYS

## 2025-08-03 ASSESSMENT — VISUAL ACUITY: OU: 1

## 2025-08-03 NOTE — H&P
History Of Present Illness  Jennifer Rangel is a 89 y.o. female presenting with a fall.  Patient was near the bathroom and then later states that she was leaving the bathroom thinking that she heard a phone call and when she turned fell on her right side.  Patient states that she may have been down for about 4 hours before she was able to get to her phone call for help.  Patient denies any loss of consciousness.  Patient has history of very frequent falls lately and has been living in independent living but no longer wishes to and wants more care than she is getting.  Daughter is at bedside helping give information.  According to her patient has had multiple falls and multiple head bleeds in the past.  Patient also is more focused on comfort at her age of 89 years.  They are inquiring about palliative care and hospice during the interview.  Discussed whether intensive care unit stay would be desired and they stated no.  They did request however repeat head CT as suggested by neurosurgery was consulted from the emergency department when it was found that she has a subdural hematoma.  Her quality of life is fairly poor and patient is ready to pass away.  She currently has no complaints including no pain anywhere.  She denies chest pain shortness of breath nausea vomiting diarrhea constipation fever chills dysuria or headache.    In the emergency department her vital signs are stable.  Labs showed an unremarkable CMP.  Troponin was 13.  CBC showed a white blood cell count of 13.6 but was otherwise unremarkable.  UA was unremarkable as well.  CT of her head without IV contrast did reveal 5 mm frontal right superior acute hematoma with no midline shift.  No other fractures were seen in the cervical spine.  She had no facial bone fractures on CT for 3D reconstruction.  Case was discussed with neurosurgery who stated the patient can get a repeat head CT if desired but no surgical intervention and patient would not  want this anyway.  Case was discussed with emergency department staff and shared decision was made to admit the patient for further workup of the above.     Past Medical History  She has a past medical history of Acute sialoadenitis (02/18/2019), Acute sialoadenitis (02/05/2015), Acute upper respiratory infection, unspecified (10/30/2017), Acute UTI (02/17/2023), Chills (without fever) (02/17/2023), Chronic rhinitis (10/12/2018), Contusion of unspecified thigh, initial encounter (09/12/2018), Diarrhea, unspecified (05/24/2017), Disorder of the skin and subcutaneous tissue, unspecified (07/25/2016), Displaced unspecified fracture of left lesser toe(s), initial encounter for closed fracture, Encounter for removal of sutures (04/05/2018), Encounter for screening mammogram for malignant neoplasm of breast (12/08/2015), Essential (primary) hypertension (01/29/2020), Impacted cerumen, unspecified ear (10/17/2017), Influenza due to unidentified influenza virus with other respiratory manifestations (04/12/2019), Laceration without foreign body of scalp, initial encounter (03/10/2018), Laceration without foreign body of unspecified upper arm, initial encounter (01/23/2020), Localized edema (09/10/2018), Nondisplaced comminuted fracture of left patella, initial encounter for closed fracture (05/20/2020), Noninfective gastroenteritis and colitis, unspecified (05/24/2017), Other abnormalities of gait and mobility (06/15/2018), Other conditions influencing health status (09/19/2014), Other rosacea (06/21/2016), Other specified soft tissue disorders (05/01/2019), Other specified soft tissue disorders (06/02/2018), Other symptoms and signs involving general sensations and perceptions (03/08/2021), Other symptoms and signs involving the musculoskeletal system (12/27/2019), Pain in left hip, Pain in left wrist (06/08/2018), Pain in unspecified ankle and joints of unspecified foot (05/15/2018), Pain in unspecified finger(s)  (12/12/2016), Pain in unspecified foot (01/07/2020), Pain in unspecified toe(s) (02/02/2018), Personal history of (healed) traumatic fracture (09/05/2015), Personal history of (healed) traumatic fracture (09/18/2015), Personal history of (healed) traumatic fracture (07/17/2018), Personal history of diseases of the blood and blood-forming organs and certain disorders involving the immune mechanism, Personal history of diseases of the skin and subcutaneous tissue (05/26/2015), Personal history of other (healed) physical injury and trauma (07/31/2018), Personal history of other (healed) physical injury and trauma (04/14/2018), Personal history of other diseases of the circulatory system (01/29/2020), Personal history of other diseases of the digestive system (03/06/2015), Personal history of other diseases of the female genital tract (12/06/2013), Personal history of other diseases of the musculoskeletal system and connective tissue, Personal history of other diseases of the musculoskeletal system and connective tissue (02/28/2015), Personal history of other diseases of the respiratory system (11/01/2017), Personal history of other diseases of the respiratory system (06/30/2016), Personal history of other diseases of the respiratory system (06/30/2016), Personal history of other infectious and parasitic diseases (06/02/2018), Personal history of other infectious and parasitic diseases (02/19/2020), Personal history of other specified conditions (12/06/2013), Personal history of other specified conditions (05/01/2019), Personal history of other specified conditions (03/08/2019), Personal history of other specified conditions (07/17/2018), Personal history of other specified conditions (11/01/2017), Personal history of other specified conditions (03/08/2021), Personal history of other specified conditions (01/29/2021), Personal history of urinary (tract) infections (10/16/2014), Pleurodynia (12/09/2015), Pleurodynia  (07/31/2018), Radiculopathy, cervical region (05/14/2014), Tremor, unspecified (11/18/2022), Unspecified injury of unspecified foot, initial encounter (03/29/2017), Unspecified open wound of unspecified upper arm, initial encounter (01/23/2020), Urinary tract infection, site not specified (01/22/2021), and Urinary tract infection, site not specified (01/22/2021).    Surgical History  She has a past surgical history that includes Other surgical history (03/02/2015); Shoulder surgery (02/05/2014); Other surgical history (02/05/2014); Other surgical history (02/05/2014); and Ankle arthroscopy w/ open repair (02/05/2014).     Social History  She reports that she has never smoked. She has never used smokeless tobacco. She reports that she does not currently use alcohol. No history on file for drug use.    Family History  Family History[1]     Allergies  Bacitracin, Erythromycin, Metronidazole, Neomycin, and Penicillins    Review of Systems   All other systems reviewed and are negative.       Physical Exam  Constitutional:       General: She is not in acute distress.     Appearance: Normal appearance.   HENT:      Head:      Comments: Large hematoma over right orbit with significant ecchymosis and slight opening that has stopped bleeding     Mouth/Throat:      Mouth: Mucous membranes are dry.     Eyes:      Extraocular Movements: Extraocular movements intact.      Conjunctiva/sclera: Conjunctivae normal.       Cardiovascular:      Rate and Rhythm: Normal rate and regular rhythm.      Heart sounds: Normal heart sounds.   Pulmonary:      Effort: Pulmonary effort is normal.      Breath sounds: Normal breath sounds. No wheezing, rhonchi or rales.   Abdominal:      General: Abdomen is flat. Bowel sounds are normal.      Palpations: Abdomen is soft.     Musculoskeletal:         General: No swelling or tenderness. Normal range of motion.      Cervical back: Normal range of motion and neck supple.     Skin:     General: Skin is  warm and dry.      Findings: No rash.     Neurological:      General: No focal deficit present.      Mental Status: She is alert. Mental status is at baseline.      Cranial Nerves: No cranial nerve deficit.      Sensory: No sensory deficit.     Psychiatric:         Mood and Affect: Mood normal.         Behavior: Behavior normal.      Comments: Fairly good insight          Last Recorded Vitals  BP (!) 155/101   Pulse 94   Temp 36.7 °C (98.1 °F)   Resp 20   Wt (!) 42.2 kg (93 lb)   SpO2 96%     Relevant Results  Scheduled medications  Scheduled Medications[2]  Continuous medications  Continuous Medications[3]  PRN medications  PRN Medications[4]    Results for orders placed or performed during the hospital encounter of 08/03/25 (from the past 24 hours)   Comprehensive metabolic panel   Result Value Ref Range    Glucose 122 (H) 74 - 99 mg/dL    Sodium 136 136 - 145 mmol/L    Potassium 4.7 3.5 - 5.3 mmol/L    Chloride 104 98 - 107 mmol/L    Bicarbonate 22 21 - 32 mmol/L    Anion Gap 15 10 - 20 mmol/L    Urea Nitrogen 37 (H) 6 - 23 mg/dL    Creatinine 0.67 0.50 - 1.05 mg/dL    eGFR 80 >60 mL/min/1.73m*2    Calcium 9.5 8.6 - 10.3 mg/dL    Albumin 4.1 3.4 - 5.0 g/dL    Alkaline Phosphatase 59 33 - 136 U/L    Total Protein 7.3 6.4 - 8.2 g/dL    AST 33 9 - 39 U/L    Bilirubin, Total 1.1 0.0 - 1.2 mg/dL    ALT 23 7 - 45 U/L   Magnesium   Result Value Ref Range    Magnesium 2.37 1.60 - 2.40 mg/dL   Troponin I, High Sensitivity, Initial   Result Value Ref Range    Troponin I, High Sensitivity 13 0 - 13 ng/L   Light Blue Top   Result Value Ref Range    Extra Tube Hold for add-ons.    Lavender Top   Result Value Ref Range    Extra Tube Hold for add-ons.    Troponin, High Sensitivity, 1 Hour   Result Value Ref Range    Troponin I, High Sensitivity 12 0 - 13 ng/L   Urinalysis with Reflex Culture and Microscopic   Result Value Ref Range    Color, Urine Light-Yellow Light-Yellow, Yellow, Dark-Yellow    Appearance, Urine Clear  Clear    Specific Gravity, Urine 1.021 1.005 - 1.035    pH, Urine 6.5 5.0, 5.5, 6.0, 6.5, 7.0, 7.5, 8.0    Protein, Urine NEGATIVE NEGATIVE, 10 (TRACE), 20 (TRACE) mg/dL    Glucose, Urine Normal Normal mg/dL    Blood, Urine NEGATIVE NEGATIVE mg/dL    Ketones, Urine TRACE (A) NEGATIVE mg/dL    Bilirubin, Urine NEGATIVE NEGATIVE mg/dL    Urobilinogen, Urine Normal Normal mg/dL    Nitrite, Urine NEGATIVE NEGATIVE    Leukocyte Esterase, Urine NEGATIVE NEGATIVE   CBC and Auto Differential   Result Value Ref Range    WBC 13.6 (H) 4.4 - 11.3 x10*3/uL    nRBC 0.0 0.0 - 0.0 /100 WBCs    RBC 3.75 (L) 4.00 - 5.20 x10*6/uL    Hemoglobin 12.7 12.0 - 16.0 g/dL    Hematocrit 37.1 36.0 - 46.0 %    MCV 99 80 - 100 fL    MCH 33.9 26.0 - 34.0 pg    MCHC 34.2 32.0 - 36.0 g/dL    RDW 15.5 (H) 11.5 - 14.5 %    Platelets 174 150 - 450 x10*3/uL    Neutrophils % 85.7 40.0 - 80.0 %    Immature Granulocytes %, Automated 0.4 0.0 - 0.9 %    Lymphocytes % 4.6 13.0 - 44.0 %    Monocytes % 9.1 2.0 - 10.0 %    Eosinophils % 0.0 0.0 - 6.0 %    Basophils % 0.2 0.0 - 2.0 %    Neutrophils Absolute 11.68 (H) 1.60 - 5.50 x10*3/uL    Immature Granulocytes Absolute, Automated 0.05 0.00 - 0.50 x10*3/uL    Lymphocytes Absolute 0.62 (L) 0.80 - 3.00 x10*3/uL    Monocytes Absolute 1.24 (H) 0.05 - 0.80 x10*3/uL    Eosinophils Absolute 0.00 0.00 - 0.40 x10*3/uL    Basophils Absolute 0.03 0.00 - 0.10 x10*3/uL       XR tibia fibula bilateral 2 views  Result Date: 8/3/2025  Interpreted By:  Ashley Marquez, STUDY: XR TIBIA FIBULA BILATERAL 2 VIEWS;  8/3/2025 3:24 pm   INDICATION: Signs/Symptoms:fall.   COMPARISON: None.   ACCESSION NUMBER(S): [id number]   ORDERING CLINICIAN: MIREILLE CANSECO   FINDINGS:   Left: No fracture. Osteopenia. 2 cerclage wires noted in the distal fibula.   Right: No fracture. Osteopenia.         No acute osseous abnormality of the bilateral tibia/fibula.   MACRO   None   Signed by: Ashley Marquez 8/3/2025 4:16 PM Dictation workstation:    [id number]    XR chest 1 view  Result Date: 8/3/2025  Interpreted By:  Ashley Marquez, STUDY: XR CHEST 1 VIEW; 8/3/2025 3:18 pm   INDICATION: Signs/Symptoms:fall   COMPARISON: Radiographs 12/19/2022   ACCESSION NUMBER(S): [id number]   ORDERING CLINICIAN: MIREILLE CANSECO   TECHNIQUE: Single frontal view of the chest performed.   FINDINGS:   LINES AND DEVICES: None.   LUNGS: Trace left pleural effusion. No additional focal consolidation, pulmonary edema, right pleural effusion or pneumothorax.   CARDIOMEDIASTINAL SILHOUETTE: Mild cardiomegaly.   OTHER: No obvious displaced rib fracture is identified as imaged.         No acute radiographic abnormality. Trace left pleural effusion.   MACRO None   Signed by: Ashley Marquez 8/3/2025 4:15 PM Dictation workstation:   [id number]    XR pelvis 1-2 views  Result Date: 8/3/2025  Interpreted By:  Ashley Marquez, STUDY: XR PELVIS 1-2 VIEWS;  8/3/2025 3:18 pm   INDICATION: Signs/Symptoms:fall.   COMPARISON: Radiographs 06/14/2018.   ACCESSION NUMBER(S): [id number]   ORDERING CLINICIAN: MIREILLE CANSECO   FINDINGS:   The pelvic ring is intact without acute fracture or widening of the pubic symphysis or sacroiliac joints. There is no acute fracture of the proximal right or left femur or hip dislocation. Severe bilateral hip osteoarthritis.         No acute osseous abnormality.   MACRO None   Signed by: Ashley Marquez 8/3/2025 4:13 PM Dictation workstation:   [id number]    CT head wo IV contrast  Result Date: 8/3/2025  Interpreted By:  Ashley Marquez, STUDY: CT HEAD WO IV CONTRAST; CT FACIAL BONES WO IV CONTRAST; CT 3D RECONSTRUCTION; CT CERVICAL SPINE WO IV CONTRAST;  8/3/2025 3:39 pm   INDICATION: Signs/Symptoms:Fall with head strike; Signs/Symptoms:Fall with bruising on the right eye; Signs/Symptoms:fall.   COMPARISON: CT head and facial bone 11/04/2021.   ACCESSION NUMBER(S): [id number]; [id number]; [id number]; [id number]   ORDERING CLINICIAN: MIREILLE CANSECO   TECHNIQUE:  CT scan of the head, facial bone and cervical spine was performed without intravenous contrast. 3D reconstruction of the facial bone was performed at an independent workstation and reviewed by the interpreting radiologist.   FINDINGS:   CT HEAD:   Parenchyma: Acute right superior frontal isoattenuating subdural hematoma measuring 5 mm in thickness (series 205, image 16). Minimal mass effect on the adjacent right frontal parenchyma. No midline shift. The grey-white differentiation is intact. Scattered periventricular white matter hypodensities, likely chronic microvascular ischemic change. Moderate parenchymal atrophy.   CSF Spaces: The ventricles, sulci and basal cisterns are proportional to the degree of parenchymal volume loss.   Calvarium: No acute depressed fracture.   Paranasal sinuses: Clear.   Mastoids: Clear.   Orbits: Bilateral lens replacements.   Soft tissues: Moderate right anterolateral scalp hematoma.     CT FACIAL BONE:   Facial Bones: No acute fracture. Remote nasal bone fractures.   Orbits: No orbital rim fracture. The orbital contents are unremarkable.   Mandible/Temporomandibular Joints: No acute fracture or dislocation.   Soft tissues: Moderate right periorbital hematoma.     CT CERVICAL SPINE:   Prevertebral/Paraspinal Soft Tissues: No acute traumatic injury.   Hardware: None.   Fracture: None.   Vertebral Body Heights: Normal.   Alignment: No traumatic listhesis. Degenerative grade 1 retrolisthesis of C5 over C6 by approximately 2 mm. No facet subluxation.   Spinal canal and neural foramina: Multilevel spondylosis, most pronounced with severe neural foraminal narrowing at C5-C6 on the right.         Acute right superior frontal subdural hematoma measuring 5 mm in thickness. No midline shift. No depressed calvarial fracture.   No acute facial bone fracture.   No acute fracture or traumatic malalignment of the cervical spine.   HALINA Marquez discussed the significance and urgency of this  critical finding by EPIC secure chat with  MIREILLE CANSECO on 8/3/2025 at 4:11 pm.  (**-RCF-**) Findings:  See findings.       Signed by: Ashley Marquez 8/3/2025 4:12 PM Dictation workstation:   [id number]    CT maxillofacial bones wo IV contrast  Result Date: 8/3/2025  Interpreted By:  Ashley Marquez, STUDY: CT HEAD WO IV CONTRAST; CT FACIAL BONES WO IV CONTRAST; CT 3D RECONSTRUCTION; CT CERVICAL SPINE WO IV CONTRAST;  8/3/2025 3:39 pm   INDICATION: Signs/Symptoms:Fall with head strike; Signs/Symptoms:Fall with bruising on the right eye; Signs/Symptoms:fall.   COMPARISON: CT head and facial bone 11/04/2021.   ACCESSION NUMBER(S): [id number]; [id number]; [id number]; [id number]   ORDERING CLINICIAN: MIREILLE CANSECO   TECHNIQUE: CT scan of the head, facial bone and cervical spine was performed without intravenous contrast. 3D reconstruction of the facial bone was performed at an independent workstation and reviewed by the interpreting radiologist.   FINDINGS:   CT HEAD:   Parenchyma: Acute right superior frontal isoattenuating subdural hematoma measuring 5 mm in thickness (series 205, image 16). Minimal mass effect on the adjacent right frontal parenchyma. No midline shift. The grey-white differentiation is intact. Scattered periventricular white matter hypodensities, likely chronic microvascular ischemic change. Moderate parenchymal atrophy.   CSF Spaces: The ventricles, sulci and basal cisterns are proportional to the degree of parenchymal volume loss.   Calvarium: No acute depressed fracture.   Paranasal sinuses: Clear.   Mastoids: Clear.   Orbits: Bilateral lens replacements.   Soft tissues: Moderate right anterolateral scalp hematoma.     CT FACIAL BONE:   Facial Bones: No acute fracture. Remote nasal bone fractures.   Orbits: No orbital rim fracture. The orbital contents are unremarkable.   Mandible/Temporomandibular Joints: No acute fracture or dislocation.   Soft tissues: Moderate right periorbital  hematoma.     CT CERVICAL SPINE:   Prevertebral/Paraspinal Soft Tissues: No acute traumatic injury.   Hardware: None.   Fracture: None.   Vertebral Body Heights: Normal.   Alignment: No traumatic listhesis. Degenerative grade 1 retrolisthesis of C5 over C6 by approximately 2 mm. No facet subluxation.   Spinal canal and neural foramina: Multilevel spondylosis, most pronounced with severe neural foraminal narrowing at C5-C6 on the right.         Acute right superior frontal subdural hematoma measuring 5 mm in thickness. No midline shift. No depressed calvarial fracture.   No acute facial bone fracture.   No acute fracture or traumatic malalignment of the cervical spine.   HALINA Marquez discussed the significance and urgency of this critical finding by EPIC secure chat with  MIREILLE CANSECO on 8/3/2025 at 4:11 pm.  (**-RCF-**) Findings:  See findings.       Signed by: Ashley Marquez 8/3/2025 4:12 PM Dictation workstation:   [id number]    CT cervical spine wo IV contrast  Result Date: 8/3/2025  Interpreted By:  Ashley Marquez, STUDY: CT HEAD WO IV CONTRAST; CT FACIAL BONES WO IV CONTRAST; CT 3D RECONSTRUCTION; CT CERVICAL SPINE WO IV CONTRAST;  8/3/2025 3:39 pm   INDICATION: Signs/Symptoms:Fall with head strike; Signs/Symptoms:Fall with bruising on the right eye; Signs/Symptoms:fall.   COMPARISON: CT head and facial bone 11/04/2021.   ACCESSION NUMBER(S): [id number]; [id number]; [id number]; [id number]   ORDERING CLINICIAN: MIREILLE CANSECO   TECHNIQUE: CT scan of the head, facial bone and cervical spine was performed without intravenous contrast. 3D reconstruction of the facial bone was performed at an independent workstation and reviewed by the interpreting radiologist.   FINDINGS:   CT HEAD:   Parenchyma: Acute right superior frontal isoattenuating subdural hematoma measuring 5 mm in thickness (series 205, image 16). Minimal mass effect on the adjacent right frontal parenchyma. No midline shift. The  grey-white differentiation is intact. Scattered periventricular white matter hypodensities, likely chronic microvascular ischemic change. Moderate parenchymal atrophy.   CSF Spaces: The ventricles, sulci and basal cisterns are proportional to the degree of parenchymal volume loss.   Calvarium: No acute depressed fracture.   Paranasal sinuses: Clear.   Mastoids: Clear.   Orbits: Bilateral lens replacements.   Soft tissues: Moderate right anterolateral scalp hematoma.     CT FACIAL BONE:   Facial Bones: No acute fracture. Remote nasal bone fractures.   Orbits: No orbital rim fracture. The orbital contents are unremarkable.   Mandible/Temporomandibular Joints: No acute fracture or dislocation.   Soft tissues: Moderate right periorbital hematoma.     CT CERVICAL SPINE:   Prevertebral/Paraspinal Soft Tissues: No acute traumatic injury.   Hardware: None.   Fracture: None.   Vertebral Body Heights: Normal.   Alignment: No traumatic listhesis. Degenerative grade 1 retrolisthesis of C5 over C6 by approximately 2 mm. No facet subluxation.   Spinal canal and neural foramina: Multilevel spondylosis, most pronounced with severe neural foraminal narrowing at C5-C6 on the right.         Acute right superior frontal subdural hematoma measuring 5 mm in thickness. No midline shift. No depressed calvarial fracture.   No acute facial bone fracture.   No acute fracture or traumatic malalignment of the cervical spine.   HALINA Marquez discussed the significance and urgency of this critical finding by EPIC secure chat with  MIREILLE CANSECO on 8/3/2025 at 4:11 pm.  (**-RCF-**) Findings:  See findings.       Signed by: Ashley Marquez 8/3/2025 4:12 PM Dictation workstation:   [id number]    CT 3D reconstruction  Result Date: 8/3/2025  Interpreted By:  Ashley Marquez, STUDY: CT HEAD WO IV CONTRAST; CT FACIAL BONES WO IV CONTRAST; CT 3D RECONSTRUCTION; CT CERVICAL SPINE WO IV CONTRAST;  8/3/2025 3:39 pm   INDICATION: Signs/Symptoms:Fall  with head strike; Signs/Symptoms:Fall with bruising on the right eye; Signs/Symptoms:fall.   COMPARISON: CT head and facial bone 11/04/2021.   ACCESSION NUMBER(S): [id number]; [id number]; [id number]; [id number]   ORDERING CLINICIAN: MIREILLE CANSECO   TECHNIQUE: CT scan of the head, facial bone and cervical spine was performed without intravenous contrast. 3D reconstruction of the facial bone was performed at an independent workstation and reviewed by the interpreting radiologist.   FINDINGS:   CT HEAD:   Parenchyma: Acute right superior frontal isoattenuating subdural hematoma measuring 5 mm in thickness (series 205, image 16). Minimal mass effect on the adjacent right frontal parenchyma. No midline shift. The grey-white differentiation is intact. Scattered periventricular white matter hypodensities, likely chronic microvascular ischemic change. Moderate parenchymal atrophy.   CSF Spaces: The ventricles, sulci and basal cisterns are proportional to the degree of parenchymal volume loss.   Calvarium: No acute depressed fracture.   Paranasal sinuses: Clear.   Mastoids: Clear.   Orbits: Bilateral lens replacements.   Soft tissues: Moderate right anterolateral scalp hematoma.     CT FACIAL BONE:   Facial Bones: No acute fracture. Remote nasal bone fractures.   Orbits: No orbital rim fracture. The orbital contents are unremarkable.   Mandible/Temporomandibular Joints: No acute fracture or dislocation.   Soft tissues: Moderate right periorbital hematoma.     CT CERVICAL SPINE:   Prevertebral/Paraspinal Soft Tissues: No acute traumatic injury.   Hardware: None.   Fracture: None.   Vertebral Body Heights: Normal.   Alignment: No traumatic listhesis. Degenerative grade 1 retrolisthesis of C5 over C6 by approximately 2 mm. No facet subluxation.   Spinal canal and neural foramina: Multilevel spondylosis, most pronounced with severe neural foraminal narrowing at C5-C6 on the right.         Acute right superior frontal  subdural hematoma measuring 5 mm in thickness. No midline shift. No depressed calvarial fracture.   No acute facial bone fracture.   No acute fracture or traumatic malalignment of the cervical spine.   HALINA Marquez discussed the significance and urgency of this critical finding by EPIC secure chat with  MIREILLE CANSECO on 8/3/2025 at 4:11 pm.  (**-RCF-**) Findings:  See findings.       Signed by: Ashley Marquez 8/3/2025 4:12 PM Dictation workstation:   [id number]      Assessment/Plan   Assessment & Plan  Subdural hematoma (Multi)  Fall, initial encounter  - Suffered unwitnessed fall 8/3/2025 and was reportedly down for 4 hours before reaching phone  - Patient with multiple falls in the past and has had multiple head bleeds in the past as well and recovered from all  - Patient not on blood thinners and had no loss of consciousness  - Neurosurgery was spoken with from the emergency department; stated can repeat CT of head which shows 5 mm right frontal subdural hematoma at this time  - Having goals of care discussion with daughter who is healthcare power of  and patient who is oriented and coherently conversive with fairly good insight states that she would prefer to focus on her comfort and quality of life over life-prolonging measures and would not want ICU level care at this time  -Family is interested in a repeat head CT just or 2 points of reference and to see if this informs her prognosis at all so repeat CT ordered for 2200  -No need for neurosurgery consult at this time but will try to keep blood pressure under 160 systolic  - Palliative care and hospice are consulted per their request  - Patient in independent living so PT/OT are consulted and this evaluation is needed to help figure out placement at discharge as she would likely go to a skilled nursing facility or long-term care facility with hospice following  Hypothyroidism  - Stable; continue medications  BMI < 18.5  - Likely a sign of  malnourishment and end-of-life    Code: DNR comfort care (confirmed on admission with patient and daughter)  DVT prophylaxis: Holding in the setting of active head bleed and being comfort care  GI prophylaxis: Not indicated  Diet: Regular    Patient will require 2 midnight stay for further management and workup of above. Premature discharge could lead to rehospitalization and/or worsening of patient's clinical condition up to and including death.    Roby Baum MD         [1]   Family History  Problem Relation Name Age of Onset    Thyroid disease Mother           at 94    Hypertension Mother      Other (advanced age) Father           around age 95    Other (advanced age) Sister          lived till mid 90s    Other (advanced age) Sister          lived till mid 90s    Uterine cancer Sister      Other (un known health history) Daughter Amarilis         lives in [place]    No Known Problems Daughter Tami     Other (motorcycle accident) Son          severe accident in 20s, complicated by oxycontin misuse    Other (Cardiac disorder) Other      Hypertension Other     [2]    [3]    [4] PRN medications: acetaminophen, melatonin, ondansetron

## 2025-08-03 NOTE — ASSESSMENT & PLAN NOTE
- Suffered unwitnessed fall 8/3/2025 and was reportedly down for 4 hours before reaching phone  - Patient with multiple falls in the past and has had multiple head bleeds in the past as well and recovered from all  - Patient not on blood thinners and had no loss of consciousness  - Neurosurgery was spoken with from the emergency department; stated can repeat CT of head which shows 5 mm right frontal subdural hematoma at this time  - Having goals of care discussion with daughter who is healthcare power of  and patient who is oriented and coherently conversive with fairly good insight states that she would prefer to focus on her comfort and quality of life over life-prolonging measures and would not want ICU level care at this time  -Family is interested in a repeat head CT just or 2 points of reference and to see if this informs her prognosis at all so repeat CT ordered for 2200  -No need for neurosurgery consult at this time but will try to keep blood pressure under 160 systolic  - Palliative care and hospice are consulted per their request  - Patient in independent living so PT/OT are consulted and this evaluation is needed to help figure out placement at discharge as she would likely go to a skilled nursing facility or long-term care facility with hospice following

## 2025-08-03 NOTE — ED TRIAGE NOTES
Patient fell at 1030 this morning. Lives at independent living at [facility] in [place]. Pt was unable to get to phone due to injury. Per squad there was 4-5 blood soaked towels in apt when they arrived. No thinners, no LOC. Aox4, very hard of hearing.

## 2025-08-03 NOTE — ED PROVIDER NOTES
EMERGENCY DEPARTMENT ENCOUNTER      Pt Name: Jennifer Rangel  MRN: [id number]  Birthdate 12/1/1927  Date of evaluation: 8/3/2025  Provider: MARVIN HUMPHRIES    CHIEF COMPLAINT       Chief Complaint   Patient presents with    Fall     From standing. - LOC, - thinners         HISTORY OF PRESENT ILLNESS    HPI  89 yo female brought in by EMS from a group home. She reports falling earlier today while reaching for something in her bathroom. She landed on her head above her right eye with a laceration that has stopped bleeding. She felt no prodrome. It took her roughly 4 hours to make her way to a phone before she got any help. She reports not beng on any blood thinners. Is not having any pain other than over her right eye. She sustained skin tears on both lower legs as well. She denies point tenderness over her LE b/l or back.  Nursing Notes were reviewed.    PAST MEDICAL HISTORY   Medical History[1]      SURGICAL HISTORY     Surgical History[2]      CURRENT MEDICATIONS       Previous Medications    CYANOCOBALAMIN (VITAMIN B-12) 1,000 MCG/ML INJECTION    1 Dose.    CYANOCOBALAMIN (VITAMIN B-12) 500 MCG TABLET    Take 1 tablet (500 mcg) by mouth once daily.    DOCUSATE SODIUM 250 MG CAPSULE    Take 1 capsule (250 mg) by mouth early in the morning.. For stool softner    FLUTICASONE (FLONASE) 50 MCG/ACTUATION NASAL SPRAY    Administer into affected nostril(s).    IBUPROFEN 400 MG TABLET    Take by mouth every 8 hours if needed.    LEVOTHYROXINE (SYNTHROID, LEVOXYL) 50 MCG TABLET    Take 1 tablet (50 mcg) by mouth once daily. On an empty stomach as directed for thyroid    LIDOCAINE (LIDODERM) 5 % PATCH    Apply topically.    LISINOPRIL 10 MG TABLET    Take 1 tablet (10 mg) by mouth once daily.    MULTIVITAMIN WITH IRON ORAL    Multivital TABS   Refills: 0       Active    MUPIROCIN (BACTROBAN) 2 % OINTMENT    APPLY THIN FILM  TO AFFECTED AREA 3 TIMES DAILY FOR 7 TO 10 DAYS.    TRIAMCINOLONE (NASACORT) 55 MCG NASAL INHALER     Administer into affected nostril(s).       ALLERGIES     Bacitracin, Erythromycin, Metronidazole, Neomycin, and Penicillins    FAMILY HISTORY     Family History[3]       SOCIAL HISTORY     Social History[4]    SCREENINGS                        PHYSICAL EXAM    (up to 7 for level 4, 8 or more for level 5)     ED Triage Vitals   Temperature Heart Rate Respirations BP   08/03/25 1436 08/03/25 1436 08/03/25 1436 08/03/25 1436   36.4 °C (97.5 °F) 90 20 (!) 179/97      Pulse Ox Temp src Heart Rate Source Patient Position   08/03/25 1436 -- 08/03/25 1500 --   97 %  Monitor       BP Location FiO2 (%)     -- --             Physical Exam  Constitutional:       General: She is awake.   HENT:      Head: Laceration present.      Jaw: No tenderness or pain on movement.     Eyes:      General: Vision grossly intact. Gaze aligned appropriately.      Intraocular pressure: Right eye pressure is 12 mmHg. Left eye pressure is 12 mmHg. Measurements were taken using a handheld tonometer.     Extraocular Movements: Extraocular movements intact.      Conjunctiva/sclera:      Right eye: Hemorrhage present.       Cardiovascular:      Rate and Rhythm: Normal rate and regular rhythm.      Pulses: Normal pulses.      Heart sounds: Normal heart sounds.   Pulmonary:      Effort: Pulmonary effort is normal.      Breath sounds: Normal breath sounds.   Abdominal:      General: Abdomen is flat.      Palpations: Abdomen is soft.     Musculoskeletal:         General: Normal range of motion.     Neurological:      Mental Status: She is alert and oriented to person, place, and time.     Psychiatric:         Behavior: Behavior is cooperative.          DIAGNOSTIC RESULTS     LABS:  Labs Reviewed   URINALYSIS WITH REFLEX CULTURE AND MICROSCOPIC    Narrative:     The following orders were created for panel order Urinalysis with Reflex Culture and Microscopic.  Procedure                               Abnormality         Status                     ---------                                -----------         ------                     Urinalysis with Reflex C...[[id number]]                                                 Extra Urine Gray Tube[[id number]]                                                         Please view results for these tests on the individual orders.   CBC WITH AUTO DIFFERENTIAL   COMPREHENSIVE METABOLIC PANEL   MAGNESIUM   TROPONIN SERIES- (INITIAL, 1 HR)    Narrative:     The following orders were created for panel order Troponin I Series, High Sensitivity (0, 1 HR).  Procedure                               Abnormality         Status                     ---------                               -----------         ------                     Troponin I, High Sensiti...[[id number]]                      In process                 Troponin, High Sensitivi...[[id number]]                                                   Please view results for these tests on the individual orders.   URINALYSIS WITH REFLEX CULTURE AND MICROSCOPIC   EXTRA URINE GRAY TUBE   SERIAL TROPONIN-INITIAL   SERIAL TROPONIN, 1 HOUR       All other labs were within normal range or not returned as of this dictation.    Imaging  CT head wo IV contrast    (Results Pending)   CT maxillofacial bones wo IV contrast    (Results Pending)   CT cervical spine wo IV contrast    (Results Pending)   XR chest 1 view    (Results Pending)   XR pelvis 1-2 views    (Results Pending)   XR tibia fibula bilateral 2 views    (Results Pending)   CT 3D reconstruction    (Results Pending)        Procedures  Procedures     EMERGENCY DEPARTMENT COURSE/MDM:     ED Course as of 08/03/25 1812   Sun Aug 03, 2025   1601 Patient was seen and examined.  89-year-old female with frequent falls presenting to the emergency department.  She turned and lost her balance and fell hitting the right side of her face.  She think she was on the ground for about 4 hours before she was able to call for help.    On exam patient is  hypertensive otherwise afebrile and hemodynamically stable.  She have large right-sided periorbital ecchymosis.  There is a scabbed over small laceration above the right eye.  She has got skin tears on the bilateral anterior shins as well.    Patient is DNR comfort care.  This confirmed with daughter at bedside.  Patient is requesting cervical collar to be removed at this time.  She has gotten her scans but they have not been read yet.  She understands that if there is something broken her neck she could become paralyzed but still wants collar removed.  I did remove the cervical collar at this time. [JJ]   1641 On CT of the head she is noted to have 5 mm right-sided subdural hematoma. [JJ]      ED Course User Index  [JJ] Joao Greco, DO         Diagnoses as of 08/03/25 1812   Fall, initial encounter   Subdural hematoma (Multi)        Medical Decision Making  89 yo female presenting post fall that resulted in a ecchymotic right eye with swelling and pain. Her ocular pressures were 12 OU both eyes.A thorough workup is pending for trauma injuries to the head, neck, facial bones, tib/fib, pelvis, and CXR.    Patient CT head remarkable for a 5 mm subdural hematoma we have reached out to neurosurgery.  Neurosurgery indicates acute blood pressure less than 160 and recheck in 6 hours.  The patient will be admitted to the hospital service for continued observation and management as the patient's caretaker would like a higher level of care and possible physical therapy.    Patient's labs remarkable for mild elevated white blood cell count urinalysis is clear chest x-ray is within normal limits as well.  Patient received Tylenol and her c-collar has been cleared.  Family has been informed and is in agreement understanding of the treatment plan.    Patient and or family in agreement and understanding of treatment plan.  All questions answered.      I reviewed the case with the attending ED physician. The attending ED  physician agrees with the plan. Patient and/or patient´s representative was counseled regarding labs, imaging, likely diagnosis, and plan. All questions were answered.    ED Medications administered this visit:  Medications - No data to display    New Prescriptions from this visit:    New Prescriptions    No medications on file       Follow-up:  No follow-up provider specified.      Final Impression: No diagnosis found.      (Please note that portions of this note were completed with a voice recognition program.  Efforts were made to edit the dictations but occasionally words are mis-transcribed.)         [1]   Past Medical History:  Diagnosis Date    Acute sialoadenitis 02/18/2019    Acute suppurative parotitis    Acute sialoadenitis 02/05/2015    Parotitis, acute    Acute upper respiratory infection, unspecified 10/30/2017    Acute upper respiratory infection    Acute UTI 02/17/2023    Chills (without fever) 02/17/2023    Chronic rhinitis 10/12/2018    Rhinitis    Contusion of unspecified thigh, initial encounter 09/12/2018    Quadriceps contusion    Diarrhea, unspecified 05/24/2017    Acute diarrhea    Disorder of the skin and subcutaneous tissue, unspecified 07/25/2016    Skin lesion    Displaced unspecified fracture of left lesser toe(s), initial encounter for closed fracture     Closed fracture of fourth toe of left foot    Encounter for removal of sutures 04/05/2018    Visit for suture removal    Encounter for screening mammogram for malignant neoplasm of breast 12/08/2015    Other screening mammogram    Essential (primary) hypertension 01/29/2020    Benign essential hypertension    Impacted cerumen, unspecified ear 10/17/2017    Ceruminosis    Influenza due to unidentified influenza virus with other respiratory manifestations 04/12/2019    Influenza-like illness    Laceration without foreign body of scalp, initial encounter 03/10/2018    Scalp laceration    Laceration without foreign body of unspecified  upper arm, initial encounter 01/23/2020    Arm laceration    Localized edema 09/10/2018    Lower extremity edema    Nondisplaced comminuted fracture of left patella, initial encounter for closed fracture 05/20/2020    Closed nondisplaced comminuted fracture of left patella, initial encounter    Noninfective gastroenteritis and colitis, unspecified 05/24/2017    Noninfectious gastroenteritis    Other abnormalities of gait and mobility 06/15/2018    Imbalance    Other conditions influencing health status 09/19/2014    Excess or deficiency of vitamin D    Other rosacea 06/21/2016    Ocular rosacea    Other specified soft tissue disorders 05/01/2019    Swelling of left lower extremity    Other specified soft tissue disorders 06/02/2018    Swelling of left hand    Other symptoms and signs involving general sensations and perceptions 03/08/2021    Facial pressure    Other symptoms and signs involving the musculoskeletal system 12/27/2019    Ankle weakness    Pain in left hip     Hip pain, left    Pain in left wrist 06/08/2018    Left wrist pain    Pain in unspecified ankle and joints of unspecified foot 05/15/2018    Ankle pain    Pain in unspecified finger(s) 12/12/2016    Finger pain    Pain in unspecified foot 01/07/2020    Acute foot pain    Pain in unspecified toe(s) 02/02/2018    Toe pain    Personal history of (healed) traumatic fracture 09/05/2015    History of fracture of phalanx of toe    Personal history of (healed) traumatic fracture 09/18/2015    History of fracture of phalanx of toe    Personal history of (healed) traumatic fracture 07/17/2018    History of closed fracture of nasal bones    Personal history of diseases of the blood and blood-forming organs and certain disorders involving the immune mechanism     History of anemia    Personal history of diseases of the skin and subcutaneous tissue 05/26/2015    History of skin pruritus    Personal history of other (healed) physical injury and trauma  07/31/2018    History of traumatic injury of head    Personal history of other (healed) physical injury and trauma 04/14/2018    History of superficial burn    Personal history of other diseases of the circulatory system 01/29/2020    History of subdural hematoma    Personal history of other diseases of the digestive system 03/06/2015    History of angular cheilitis    Personal history of other diseases of the female genital tract 12/06/2013    History of senile atrophic vaginitis    Personal history of other diseases of the musculoskeletal system and connective tissue     History of arthritis    Personal history of other diseases of the musculoskeletal system and connective tissue 02/28/2015    History of ganglion cyst    Personal history of other diseases of the respiratory system 11/01/2017    History of acute bronchitis    Personal history of other diseases of the respiratory system 06/30/2016    History of allergic rhinitis    Personal history of other diseases of the respiratory system 06/30/2016    History of acute sinusitis    Personal history of other infectious and parasitic diseases 06/02/2018    History of herpes zoster    Personal history of other infectious and parasitic diseases 02/19/2020    History of candidiasis of mouth    Personal history of other specified conditions 12/06/2013    History of urinary frequency    Personal history of other specified conditions 05/01/2019    History of chronic cough    Personal history of other specified conditions 03/08/2019    History of urinary frequency    Personal history of other specified conditions 07/17/2018    History of dysuria    Personal history of other specified conditions 11/01/2017    History of shortness of breath    Personal history of other specified conditions 03/08/2021    History of facial pain    Personal history of other specified conditions 01/29/2021    History of epistaxis    Personal history of urinary (tract) infections 10/16/2014     Personal history of urinary tract infection    Pleurodynia 2015    Rib pain    Pleurodynia 2018    Rib pain    Radiculopathy, cervical region 2014    Cervical radiculopathy    Tremor, unspecified 2022    Episode of shaking    Unspecified injury of unspecified foot, initial encounter 2017    Foot injury    Unspecified open wound of unspecified upper arm, initial encounter 2020    Arm wound    Urinary tract infection, site not specified 2021    Acute lower UTI (urinary tract infection)    Urinary tract infection, site not specified 2021    Acute UTI   [2]   Past Surgical History:  Procedure Laterality Date    ANKLE ARTHROSCOPY W/ OPEN REPAIR  2014    Ankle Repair    OTHER SURGICAL HISTORY  2015    Arm Excision    OTHER SURGICAL HISTORY  2014    Leg Repair    OTHER SURGICAL HISTORY  2014    Wrist Surgery    SHOULDER SURGERY  2014    Shoulder Surgery   [3]   Family History  Problem Relation Name Age of Onset    Thyroid disease Mother           at 94    Hypertension Mother      Other (advanced age) Father           around age 95    Other (advanced age) Sister          lived till mid 90s    Other (advanced age) Sister          lived till mid 90s    Uterine cancer Sister      Other (un known health history) Daughter Amarilis         lives in [place]    No Known Problems Daughter Tami     Other (motorcycle accident) Son          severe accident in 20s, complicated by oxycontin misuse    Other (Cardiac disorder) Other      Hypertension Other     [4]   Social History  Socioeconomic History    Marital status:    Tobacco Use    Smoking status: Never    Smokeless tobacco: Never   Substance and Sexual Activity    Alcohol use: Not Currently        Marv Navarro MD  Resident  [phone]     were completed with a voice recognition program.  Efforts were made to edit the dictations but occasionally words are mis-transcribed.)       Marv Navarro MD  Resident  08/03/25 1814    I was present for the entirety of the procedure(s).     The patient was seen by the resident/fellow.  I have personally performed a substantive portion of the encounter.  I have seen and examined the patient; agree with the workup, evaluation, MDM, management and diagnosis.  The care plan has been discussed with the resident; I have reviewed the resident’s note and agree with the documented findings.                                                                             [1]   Past Medical History:  Diagnosis Date    Acute sialoadenitis 02/18/2019    Acute suppurative parotitis    Acute sialoadenitis 02/05/2015    Parotitis, acute    Acute upper respiratory infection, unspecified 10/30/2017    Acute upper respiratory infection    Acute UTI 02/17/2023    Chills (without fever) 02/17/2023    Chronic rhinitis 10/12/2018    Rhinitis    Contusion of unspecified thigh, initial encounter 09/12/2018    Quadriceps contusion    Diarrhea, unspecified 05/24/2017    Acute diarrhea    Disorder of the skin and subcutaneous tissue, unspecified 07/25/2016    Skin lesion    Displaced unspecified fracture of left lesser toe(s), initial encounter for closed fracture     Closed fracture of fourth toe of left foot    Encounter for removal of sutures 04/05/2018    Visit for suture removal    Encounter for screening mammogram for malignant neoplasm of breast 12/08/2015    Other screening mammogram    Essential (primary) hypertension 01/29/2020    Benign essential hypertension    Impacted cerumen, unspecified ear 10/17/2017    Ceruminosis    Influenza due to unidentified influenza virus with other respiratory manifestations 04/12/2019    Influenza-like illness    Laceration without foreign body of scalp, initial encounter 03/10/2018    Scalp laceration     Laceration without foreign body of unspecified upper arm, initial encounter 01/23/2020    Arm laceration    Localized edema 09/10/2018    Lower extremity edema    Nondisplaced comminuted fracture of left patella, initial encounter for closed fracture 05/20/2020    Closed nondisplaced comminuted fracture of left patella, initial encounter    Noninfective gastroenteritis and colitis, unspecified 05/24/2017    Noninfectious gastroenteritis    Other abnormalities of gait and mobility 06/15/2018    Imbalance    Other conditions influencing health status 09/19/2014    Excess or deficiency of vitamin D    Other rosacea 06/21/2016    Ocular rosacea    Other specified soft tissue disorders 05/01/2019    Swelling of left lower extremity    Other specified soft tissue disorders 06/02/2018    Swelling of left hand    Other symptoms and signs involving general sensations and perceptions 03/08/2021    Facial pressure    Other symptoms and signs involving the musculoskeletal system 12/27/2019    Ankle weakness    Pain in left hip     Hip pain, left    Pain in left wrist 06/08/2018    Left wrist pain    Pain in unspecified ankle and joints of unspecified foot 05/15/2018    Ankle pain    Pain in unspecified finger(s) 12/12/2016    Finger pain    Pain in unspecified foot 01/07/2020    Acute foot pain    Pain in unspecified toe(s) 02/02/2018    Toe pain    Personal history of (healed) traumatic fracture 09/05/2015    History of fracture of phalanx of toe    Personal history of (healed) traumatic fracture 09/18/2015    History of fracture of phalanx of toe    Personal history of (healed) traumatic fracture 07/17/2018    History of closed fracture of nasal bones    Personal history of diseases of the blood and blood-forming organs and certain disorders involving the immune mechanism     History of anemia    Personal history of diseases of the skin and subcutaneous tissue 05/26/2015    History of skin pruritus    Personal history of  other (healed) physical injury and trauma 07/31/2018    History of traumatic injury of head    Personal history of other (healed) physical injury and trauma 04/14/2018    History of superficial burn    Personal history of other diseases of the circulatory system 01/29/2020    History of subdural hematoma    Personal history of other diseases of the digestive system 03/06/2015    History of angular cheilitis    Personal history of other diseases of the female genital tract 12/06/2013    History of senile atrophic vaginitis    Personal history of other diseases of the musculoskeletal system and connective tissue     History of arthritis    Personal history of other diseases of the musculoskeletal system and connective tissue 02/28/2015    History of ganglion cyst    Personal history of other diseases of the respiratory system 11/01/2017    History of acute bronchitis    Personal history of other diseases of the respiratory system 06/30/2016    History of allergic rhinitis    Personal history of other diseases of the respiratory system 06/30/2016    History of acute sinusitis    Personal history of other infectious and parasitic diseases 06/02/2018    History of herpes zoster    Personal history of other infectious and parasitic diseases 02/19/2020    History of candidiasis of mouth    Personal history of other specified conditions 12/06/2013    History of urinary frequency    Personal history of other specified conditions 05/01/2019    History of chronic cough    Personal history of other specified conditions 03/08/2019    History of urinary frequency    Personal history of other specified conditions 07/17/2018    History of dysuria    Personal history of other specified conditions 11/01/2017    History of shortness of breath    Personal history of other specified conditions 03/08/2021    History of facial pain    Personal history of other specified conditions 01/29/2021    History of epistaxis    Personal history of  urinary (tract) infections 10/16/2014    Personal history of urinary tract infection    Pleurodynia 2015    Rib pain    Pleurodynia 2018    Rib pain    Radiculopathy, cervical region 2014    Cervical radiculopathy    Tremor, unspecified 2022    Episode of shaking    Unspecified injury of unspecified foot, initial encounter 2017    Foot injury    Unspecified open wound of unspecified upper arm, initial encounter 2020    Arm wound    Urinary tract infection, site not specified 2021    Acute lower UTI (urinary tract infection)    Urinary tract infection, site not specified 2021    Acute UTI   [2]   Past Surgical History:  Procedure Laterality Date    ANKLE ARTHROSCOPY W/ OPEN REPAIR  2014    Ankle Repair    OTHER SURGICAL HISTORY  2015    Arm Excision    OTHER SURGICAL HISTORY  2014    Leg Repair    OTHER SURGICAL HISTORY  2014    Wrist Surgery    SHOULDER SURGERY  2014    Shoulder Surgery   [3]   Family History  Problem Relation Name Age of Onset    Thyroid disease Mother           at 94    Hypertension Mother      Other (advanced age) Father           around age 95    Other (advanced age) Sister          lived till mid 90s    Other (advanced age) Sister          lived till mid 90s    Uterine cancer Sister      Other (un known health history) Daughter Amarilis         lives in [place]    No Known Problems Daughter Tami     Other (motorcycle accident) Son          severe accident in 20s, complicated by oxycontin misuse    Other (Cardiac disorder) Other      Hypertension Other     [4]   Social History  Socioeconomic History    Marital status:    Tobacco Use    Smoking status: Never    Smokeless tobacco: Never   Substance and Sexual Activity    Alcohol use: Not Currently     Social Drivers of Health     Financial Resource Strain: Low Risk  (8/3/2025)    Overall Financial Resource Strain (CARDIA)     Difficulty of Paying  Living Expenses: Not hard at all   Food Insecurity: No Food Insecurity (8/3/2025)    Hunger Vital Sign     Worried About Running Out of Food in the Last Year: Never true     Ran Out of Food in the Last Year: Never true   Transportation Needs: No Transportation Needs (8/3/2025)    PRAPARE - Transportation     Lack of Transportation (Medical): No     Lack of Transportation (Non-Medical): No   Intimate Partner Violence: Not At Risk (8/3/2025)    Humiliation, Afraid, Rape, and Kick questionnaire     Fear of Current or Ex-Partner: No     Emotionally Abused: No     Physically Abused: No     Sexually Abused: No   Housing Stability: Low Risk  (8/3/2025)    Housing Stability Vital Sign     Unable to Pay for Housing in the Last Year: No     Number of Times Moved in the Last Year: 1     Homeless in the Last Year: No        Joao Greco DO  08/04/25 1245

## 2025-08-04 VITALS
HEART RATE: 90 BPM | OXYGEN SATURATION: 97 % | RESPIRATION RATE: 21 BRPM | SYSTOLIC BLOOD PRESSURE: 116 MMHG | WEIGHT: 105.38 LBS | BODY MASS INDEX: 20.69 KG/M2 | HEIGHT: 60 IN | DIASTOLIC BLOOD PRESSURE: 64 MMHG | TEMPERATURE: 97.2 F

## 2025-08-04 LAB
ATRIAL RATE: 90 BPM
HOLD SPECIMEN: NORMAL
P AXIS: 78 DEGREES
P OFFSET: 176 MS
P ONSET: 125 MS
PR INTERVAL: 202 MS
Q ONSET: 226 MS
QRS COUNT: 15 BEATS
QRS DURATION: 62 MS
QT INTERVAL: 378 MS
QTC CALCULATION(BAZETT): 462 MS
QTC FREDERICIA: 432 MS
R AXIS: 34 DEGREES
T AXIS: 74 DEGREES
T OFFSET: 415 MS
VENTRICULAR RATE: 90 BPM

## 2025-08-04 PROCEDURE — 2500000002 HC RX 250 W HCPCS SELF ADMINISTERED DRUGS (ALT 637 FOR MEDICARE OP, ALT 636 FOR OP/ED): Performed by: INTERNAL MEDICINE

## 2025-08-04 PROCEDURE — 97165 OT EVAL LOW COMPLEX 30 MIN: CPT | Mod: GO | Performed by: OCCUPATIONAL THERAPIST

## 2025-08-04 PROCEDURE — 97162 PT EVAL MOD COMPLEX 30 MIN: CPT | Mod: GP

## 2025-08-04 PROCEDURE — 1200000002 HC GENERAL ROOM WITH TELEMETRY DAILY

## 2025-08-04 PROCEDURE — 99239 HOSP IP/OBS DSCHRG MGMT >30: CPT | Performed by: INTERNAL MEDICINE

## 2025-08-04 RX ORDER — LEVOTHYROXINE SODIUM 50 UG/1
50 TABLET ORAL DAILY
Status: DISCONTINUED | OUTPATIENT
Start: 2025-08-04 | End: 2025-08-05 | Stop reason: HOSPADM

## 2025-08-04 RX ADMIN — LEVOTHYROXINE SODIUM 50 MCG: 0.05 TABLET ORAL at 11:35

## 2025-08-04 ASSESSMENT — COGNITIVE AND FUNCTIONAL STATUS - GENERAL
MOBILITY SCORE: 19
WALKING IN HOSPITAL ROOM: A LITTLE
MOVING TO AND FROM BED TO CHAIR: A LITTLE
MOVING TO AND FROM BED TO CHAIR: A LITTLE
CLIMB 3 TO 5 STEPS WITH RAILING: A LOT
CLIMB 3 TO 5 STEPS WITH RAILING: A LITTLE
DRESSING REGULAR LOWER BODY CLOTHING: A LITTLE
DRESSING REGULAR UPPER BODY CLOTHING: A LITTLE
HELP NEEDED FOR BATHING: A LITTLE
DAILY ACTIVITIY SCORE: 16
WALKING IN HOSPITAL ROOM: A LITTLE
PERSONAL GROOMING: A LITTLE
WALKING IN HOSPITAL ROOM: A LOT
TOILETING: A LOT
STANDING UP FROM CHAIR USING ARMS: A LITTLE
TURNING FROM BACK TO SIDE WHILE IN FLAT BAD: A LITTLE
MOBILITY SCORE: 15
MOVING TO AND FROM BED TO CHAIR: A LITTLE
DRESSING REGULAR LOWER BODY CLOTHING: A LOT
TOILETING: A LITTLE
HELP NEEDED FOR BATHING: A LITTLE
MOBILITY SCORE: 19
DAILY ACTIVITIY SCORE: 21
CLIMB 3 TO 5 STEPS WITH RAILING: A LITTLE
TURNING FROM BACK TO SIDE WHILE IN FLAT BAD: A LITTLE
MOVING FROM LYING ON BACK TO SITTING ON SIDE OF FLAT BED WITH BEDRAILS: A LITTLE
DRESSING REGULAR UPPER BODY CLOTHING: A LITTLE
DAILY ACTIVITIY SCORE: 19
DRESSING REGULAR LOWER BODY CLOTHING: A LOT
TURNING FROM BACK TO SIDE WHILE IN FLAT BAD: A LITTLE
STANDING UP FROM CHAIR USING ARMS: A LOT
STANDING UP FROM CHAIR USING ARMS: A LITTLE
HELP NEEDED FOR BATHING: A LOT
TOILETING: A LITTLE

## 2025-08-04 ASSESSMENT — PAIN - FUNCTIONAL ASSESSMENT
PAIN_FUNCTIONAL_ASSESSMENT: 0-10

## 2025-08-04 ASSESSMENT — PAIN SCALES - GENERAL
PAINLEVEL_OUTOF10: 0 - NO PAIN

## 2025-08-04 NOTE — PROGRESS NOTES
Occupational Therapy  Evaluation    Patient Name: Jennifer Rangel  MRN: [id number]  Today's Date: 8/4/2025  Time Calculation  Start Time: 1025  Stop Time: 1042  Time Calculation (min): 17 min  2112/2112-A    Assessment  IP OT Assessment  OT Assessment: Patient demonstrates decreased independence with self care, decreased independence with functional transfers,decreased balance, decreased strength and decreased endurance.  Patient will benefit from skilled OT to address deficits. Anticipate patient will benefit from moderate intensity therapy post hospital stay. Based on current functional status and rehab potential, patient is anticipated to tolerate and benefit from 5 or more days a week of skilled therapy after discharge from this acute inpatient hospitalization.   Prognosis: Good  Barriers to Discharge Home: Physical needs  Physical Needs: High falls risk due to function or environment  Evaluation/Treatment Tolerance: Patient tolerated treatment well  Medical Staff Made Aware: Yes  End of Session Communication: Bedside nurse  End of Session Patient Position: Up in chair, Alarm on    Plan:  Treatment Interventions: ADL retraining, Functional transfer training, Endurance training, UE strengthening/ROM, Patient/family training  OT Frequency: 3 times per week (during this inpatient hospital stay)  OT Discharge Recommendations: Moderate intensity level of continued care  Equipment Recommended upon Discharge: Wheeled walker  OT Recommended Transfer Status: Minimal assist, Assist of 2  OT - OK to Discharge: Yes (to next level of care when medically cleared by physician)    Subjective     Current Problem:  1. Fall, initial encounter        2. Subdural hematoma (Multi)            General:  General  Reason for Referral: impaired self care  Referred By: Roby Baum  Past Medical History Relevant to Rehab: 89 y.o. female presenting with a fall.  Patient was near the bathroom and then later states that she was  leaving the bathroom thinking that she heard a phone call and when she turned fell on her right side.  Patient states that she may have been down for about 4 hours before she was able to get to her phone call for help.  Patient denies any loss of consciousness.  Patient has history of very frequent falls lately and has been living in independent living but no longer wishes to and wants more care than she is getting.  Daughter is at bedside helping give information.  According to her patient has had multiple falls and multiple head bleeds in the past  Co-Treatment: PT  Co-Treatment Reason: safety  Prior to Session Communication: Bedside nurse  Patient Position Received: Bed, 3 rail up, Alarm on  Preferred Learning Style: verbal, visual  General Comment: Patient agreeable to therapy.    Precautions:  Medical Precautions: Fall precautions (SBP <160)      Pain:  Pain Assessment  Pain Assessment: 0-10  0-10 (Numeric) Pain Score: 0 - No pain    Objective     Cognition:  Overall Cognitive Status: Within Functional Limits  Orientation Level: Oriented X4       Home Living:  Type of Home: Independent living ([place])     Prior Function:  Level of [place]: Independent with ADLs and functional transfers  Prior Function Comments: uses a 4WW at baseline, +falls      ADL:  LE Dressing Assistance: Moderate  Toileting Assistance with Device: Minimal    Activity Tolerance:  Endurance: Tolerates 10 - 20 min exercise with multiple rests    Bed Mobility/Transfers:   Bed Mobility  Bed Mobility: Yes  Bed Mobility 1  Bed Mobility 1: Supine to sitting  Level of Assistance 1: Contact guard  Transfers  Transfer: Yes  Transfer 1  Transfer From 1: Sit to  Transfer to 1: Stand  Technique 1: Sit to stand, Stand to sit  Transfer Device 1: Walker, Gait belt  Transfer Level of Assistance 1: Moderate assistance, Minimal verbal cues  Transfers 2  Transfer From 2: Bed to  Transfer to 2: Chair with arms  Technique 2: Stand pivot  Transfer Device  2: Walker, Gait belt  Transfer Level of Assistance 2: Minimum assistance, +2  Trials/Comments 2: cues for safety    Ambulation/Gait Training:  Functional Mobility  Functional Mobility Performed: No    Sensation:  Sensation Comment: denies any numbness/tingling    Extremities: RUE   RUE : Within Functional Limits and LUE   LUE: Within Functional Limits    Outcome Measures: [facility] Daily Activity  Putting on and taking off regular lower body clothing: A lot  Bathing (including washing, rinsing, drying): A lot  Putting on and taking off regular upper body clothing: A little  Toileting, which includes using toilet, bedpan or urinal: A lot  Taking care of personal grooming such as brushing teeth: A little  Eating Meals: None  Daily Activity - Total Score: 16              EDUCATION:  Education  Individual(s) Educated: Patient  Education Provided: Fall precautons, Risk and benefits of OT discussed with patient or other  Plan of Care Discussed and Agreed Upon: yes  Patient Response to Education: Patient/Caregiver Verbalized Understanding of Information    Goals:   Encounter Problems       Encounter Problems (Active)       OT Goals       Patient will complete functional transfers with mod I. (Progressing)       Start:  08/04/25    Expected End:  08/18/25            Patient will complete toileting with mod I. (Progressing)       Start:  08/04/25    Expected End:  08/18/25            Patient will complete LE dressing with mod I. (Progressing)       Start:  08/04/25    Expected End:  08/18/25            Patient will complete grooming with mod I standing at sink. (Progressing)       Start:  08/04/25    Expected End:  08/18/25

## 2025-08-04 NOTE — CARE PLAN
Patient remained in NSR and on RA. IV hydralazine given for SBP >160. Patient taken to CT during shift. No c/o pain. Neuro checks remain unchanged. Safety maintained.   Problem: Pain - Adult  Goal: Verbalizes/displays adequate comfort level or baseline comfort level  Outcome: Progressing     Problem: Safety - Adult  Goal: Free from fall injury  Outcome: Progressing     Problem: Discharge Planning  Goal: Discharge to home or other facility with appropriate resources  Outcome: Progressing     Problem: Chronic Conditions and Co-morbidities  Goal: Patient's chronic conditions and co-morbidity symptoms are monitored and maintained or improved  Outcome: Progressing     Problem: Nutrition  Goal: Nutrient intake appropriate for maintaining nutritional needs  Outcome: Progressing

## 2025-08-04 NOTE — PROGRESS NOTES
Physical Therapy    Physical Therapy Evaluation    Patient Name: Jennifer Rangel  MRN: [id number]  Today's Date: 8/4/2025   Time Calculation  Start Time: 1025  Stop Time: 1040  Time Calculation (min): 15 min  2112/2112-A    Assessment/Plan   PT Assessment: Pt demonstrates impairments listed below.  Pt appears below baseline level of function and based on current level of function, pt would benefit from continued skilled therapy while in the hospital to ensure safety, decrease risk of falls, and regain strength/mobility back to baseline.  Once stable enough for discharge, pt would benefit from moderate intensity therapy.     PT Assessment Results: Decreased strength, Decreased range of motion, Impaired balance, Decreased mobility, Decreased safety awareness, Impaired judgement  Rehab Prognosis: Good  Barriers to Discharge Home: Caregiver assistance, Physical needs  Caregiver Assistance: Caregiver assistance needed per identified barriers - however, level of patient's required assistance exceeds assistance available at home  Physical Needs: 24hr mobility assistance needed, 24hr ADL assistance needed, High falls risk due to function or environment  Evaluation/Treatment Tolerance: Patient tolerated treatment well, Patient limited by fatigue  Medical Staff Made Aware: Yes  End of Session Communication: Bedside nurse  End of Session Patient Position: Up in chair, Alarm on  IP OR SWING BED PT PLAN  Inpatient or Swing Bed: Inpatient  PT Plan  Treatment/Interventions: Bed mobility, Transfer training, Gait training, Balance training, Neuromuscular re-education, Strengthening, Range of motion, Therapeutic exercise, Therapeutic activity, Home exercise program  PT Plan: Ongoing PT  PT Frequency: 5 times per week  PT Discharge Recommendations: Moderate intensity level of continued care  Equipment Recommended upon Discharge: Wheeled walker  PT Recommended Transfer Status: Assist x2 (Min-Mod A)  PT - OK to Discharge: Yes - To  next level of care when cleared by medical team    Subjective     Current Problem:  1. Fall, initial encounter        2. Subdural hematoma (Multi)            Past Medical History:  Problem List[1]    General Visit Information:  Per EMR: pt presenting with a fall.  Patient was near the bathroom and then later states that she was leaving the bathroom thinking that she heard a phone call and when she turned fell on her right side.  Patient states that she may have been down for about 4 hours before she was able to get to her phone call for help.  Patient denies any loss of consciousness.  Patient has history of very frequent falls lately and has been living in independent living but no longer wishes to and wants more care than she is getting.  Daughter is at bedside helping give information.  According to her patient has had multiple falls and multiple head bleeds in the past.  Patient also is more focused on comfort at her age of 89 years.  They are inquiring about palliative care and hospice during the interview.  Discussed whether intensive care unit stay would be desired and they stated no.  They did request however repeat head CT as suggested by neurosurgery was consulted from the emergency department when it was found that she has a subdural hematoma.  Her quality of life is fairly poor and patient is ready to pass away.  She currently has no complaints including no pain anywhere.  She denies chest pain shortness of breath nausea vomiting diarrhea constipation fever chills dysuria or headache.     In the emergency department her vital signs are stable.  Labs showed an unremarkable CMP.  Troponin was 13.  CBC showed a white blood cell count of 13.6 but was otherwise unremarkable.  UA was unremarkable as well.  CT of her head without IV contrast did reveal 5 mm frontal right superior acute hematoma with no midline shift.  No other fractures were seen in the cervical spine.  She had no facial bone fractures on CT  "for 3D reconstruction.  Case was discussed with neurosurgery who stated the patient can get a repeat head CT if desired but no surgical intervention and patient would not want this anyway.  Case was discussed with emergency department staff and shared decision was made to admit the patient for further workup of the above.    On arrival, pt supine in bed.  Pt in no apparent distress and agreeable to therapy.    General  Reason for Referral: impaired mobility  Referred By: Roby Baum  Co-Treatment: OT  Co-Treatment Reason: safety  Prior to Session Communication: Bedside nurse  Patient Position Received: Bed, 3 rail up, Alarm on    Home Living/PLOF:  Pt lives at [facility].  Pt is IND with ADLs and IADLs.  Ambulates using rollator.  Pt has an aide who assist with transportation and shopping.  Pt reports \"a few falls.\"    Precautions:  Precautions  Medical Precautions: Fall precautions (SBP <160)     Objective     Pain:  Pain Assessment  Pain Assessment: 0-10  0-10 (Numeric) Pain Score: 0 - No pain    Cognition:  Cognition  Overall Cognitive Status: Within Functional Limits  Orientation Level: Oriented X4    General Assessments:      Activity Tolerance  Endurance: Tolerates 10 - 20 min exercise with multiple rests  Sensation  Sensation Comment: denies any numbness/tingling    Static Sitting Balance  Static Sitting-Comment/Number of Minutes: fair plus  Dynamic Sitting Balance  Dynamic Sitting-Comments: fair  Static Standing Balance  Static Standing-Comment/Number of Minutes: fair  Dynamic Standing Balance  Dynamic Standing-Comments: fair minus    Extremity/Trunk Assessments:  BLE strength: grossly WFL; not formally tested 2/2 abrasions to B knees    Functional Mobility:  Bed mobility  Supine to sit: CGA with increased time to get hips to EOB    Transfers  Sit to stand: Mod A x2; Vcs for hand placement; increased time required to reach full stand     Stand to sit: Min A x2; Vcs for hand placement and safe " clearance     Ambulation/Stairs  Ambulated 2-3 turning steps to chair with Min A x2 and FWW; Vcs for sequencing and guidance     Outcome Measures:  [facility] Basic Mobility  Turning from your back to your side while in a flat bed without using bedrails: A little  Moving from lying on your back to sitting on the side of a flat bed without using bedrails: A little  Moving to and from bed to chair (including a wheelchair): A little  Standing up from a chair using your arms (e.g. wheelchair or bedside chair): A lot  To walk in hospital room: A lot  Climbing 3-5 steps with railing: A lot  Basic Mobility - Total Score: 15    Goals:  Encounter Problems       Encounter Problems (Active)       PT Problem       Pt will be able to perform all bed mobility tasks with Mod I.        Start:  08/04/25    Expected End:  08/18/25            Pt will perform all transfers with Mod I and proper safety mechanics.         Start:  08/04/25    Expected End:  08/18/25            Pt will ambulate 100 ft with Mod I using FWW for improved functional independence.        Start:  08/04/25    Expected End:  08/18/25                 Education Documentation  Body Mechanics, taught by Ciara Robles, PT at 8/4/2025  2:13 PM.  Learner: Patient  Readiness: Acceptance  Method: Explanation  Response: Verbalizes Understanding, Needs Reinforcement    Home Exercise Program, taught by Ciara Robles PT at 8/4/2025  2:13 PM.  Learner: Patient  Readiness: Acceptance  Method: Explanation  Response: Verbalizes Understanding, Needs Reinforcement    Mobility Training, taught by Ciara Robles PT at 8/4/2025  2:13 PM.  Learner: Patient  Readiness: Acceptance  Method: Explanation  Response: Verbalizes Understanding, Needs Reinforcement    Education Comments  No comments found.             [1]   Patient Active Problem List  Diagnosis    Advanced age    Angular cheilitis    Benign essential hypertension    Bilateral sensorineural hearing loss    Chronic  constipation    Chronic rhinitis    Difficulty walking    Episode of shaking    Gluten enteropathy    H/O Sjogren's disease (Multi)    Primary localized osteoarthritis of left hip    Hyponatremia    Hypothyroidism    Imerslund's syndrome    Jaw pain    Cervicalgia    Leg edema, left    Osteoarthritis of left knee    Metacarpophalangeal joint pain    Lymphocytic colitis    Osteopenia    Peroneal tendonitis, left    Primary localized osteoarthrosis of left forearm    Arthritis, degenerative, localized, primary, hand    Primary osteoarthritis of both knees    PTSD (post-traumatic stress disorder)    Pulmonary nodule    Seasonal allergies    Serous otitis media    Sesamoiditis    Skin symptom    Stiffness of left ankle, not elsewhere classified    Vertigo    Vitamin B12 deficiency    Unsteady gait    Lichen simplex chronicus    Lip symptom    Nail disorder, unspecified    Other seborrheic keratosis    Perioral dermatitis    Personal history of other malignant neoplasm of skin    Rash and other nonspecific skin eruption    Colitis    Health care home, active care coordination    Chronic joint pain    Situational depression    Gluten intolerance    Decreased independence with activities of daily living    Lives in assisted living facility    Concern about food or nutrition    BMI < 18.5    Fall, initial encounter    Subdural hematoma (Multi)

## 2025-08-04 NOTE — PROGRESS NOTES
Patient will be having a Hospice meeting today at 3 pm. CT team to follow patient discharge planning if needed.

## 2025-08-04 NOTE — PROGRESS NOTES
Physical Therapy                 Therapy Communication Note    Patient Name: Jennifer Rangel  MRN: [id number]  Department: [facility] 2  Room: 2112/2112-A  Today's Date: 8/4/2025     Discipline: Physical Therapy    Missed Visit: PT Missed Visit: Yes     Missed Time: Attempt    Comment:  PT orders received, chart reviewed.  Attempted PT eval at 0914, obtained PLOF and home set up.  Pt then deferred mobility portion of eval until she finishes her breakfast.  Will re-attempt as able.

## 2025-08-04 NOTE — NURSING NOTE
S: Jennifer Rangel arrived into 2112/2112-A at this time.    B: SDH.    A: Dr. Kaufman notified that patient has arrived.     R: Will continue to monitor.

## 2025-08-04 NOTE — CONSULTS
Reason For Consult    89, frequent falls; stable SDH here; patient/daughter interested in pall care/hospice     History Of Present Illness  Jennifer Rangel is a 89 y.o. female presenting with fall.     Past Medical History  She has a past medical history of Acute sialoadenitis (02/18/2019), Acute sialoadenitis (02/05/2015), Acute upper respiratory infection, unspecified (10/30/2017), Acute UTI (02/17/2023), Chills (without fever) (02/17/2023), Chronic rhinitis (10/12/2018), Contusion of unspecified thigh, initial encounter (09/12/2018), Diarrhea, unspecified (05/24/2017), Disorder of the skin and subcutaneous tissue, unspecified (07/25/2016), Displaced unspecified fracture of left lesser toe(s), initial encounter for closed fracture, Encounter for removal of sutures (04/05/2018), Encounter for screening mammogram for malignant neoplasm of breast (12/08/2015), Essential (primary) hypertension (01/29/2020), Impacted cerumen, unspecified ear (10/17/2017), Influenza due to unidentified influenza virus with other respiratory manifestations (04/12/2019), Laceration without foreign body of scalp, initial encounter (03/10/2018), Laceration without foreign body of unspecified upper arm, initial encounter (01/23/2020), Localized edema (09/10/2018), Nondisplaced comminuted fracture of left patella, initial encounter for closed fracture (05/20/2020), Noninfective gastroenteritis and colitis, unspecified (05/24/2017), Other abnormalities of gait and mobility (06/15/2018), Other conditions influencing health status (09/19/2014), Other rosacea (06/21/2016), Other specified soft tissue disorders (05/01/2019), Other specified soft tissue disorders (06/02/2018), Other symptoms and signs involving general sensations and perceptions (03/08/2021), Other symptoms and signs involving the musculoskeletal system (12/27/2019), Pain in left hip, Pain in left wrist (06/08/2018), Pain in unspecified ankle and joints of unspecified foot  (05/15/2018), Pain in unspecified finger(s) (12/12/2016), Pain in unspecified foot (01/07/2020), Pain in unspecified toe(s) (02/02/2018), Personal history of (healed) traumatic fracture (09/05/2015), Personal history of (healed) traumatic fracture (09/18/2015), Personal history of (healed) traumatic fracture (07/17/2018), Personal history of diseases of the blood and blood-forming organs and certain disorders involving the immune mechanism, Personal history of diseases of the skin and subcutaneous tissue (05/26/2015), Personal history of other (healed) physical injury and trauma (07/31/2018), Personal history of other (healed) physical injury and trauma (04/14/2018), Personal history of other diseases of the circulatory system (01/29/2020), Personal history of other diseases of the digestive system (03/06/2015), Personal history of other diseases of the female genital tract (12/06/2013), Personal history of other diseases of the musculoskeletal system and connective tissue, Personal history of other diseases of the musculoskeletal system and connective tissue (02/28/2015), Personal history of other diseases of the respiratory system (11/01/2017), Personal history of other diseases of the respiratory system (06/30/2016), Personal history of other diseases of the respiratory system (06/30/2016), Personal history of other infectious and parasitic diseases (06/02/2018), Personal history of other infectious and parasitic diseases (02/19/2020), Personal history of other specified conditions (12/06/2013), Personal history of other specified conditions (05/01/2019), Personal history of other specified conditions (03/08/2019), Personal history of other specified conditions (07/17/2018), Personal history of other specified conditions (11/01/2017), Personal history of other specified conditions (03/08/2021), Personal history of other specified conditions (01/29/2021), Personal history of urinary (tract) infections  (10/16/2014), Pleurodynia (12/09/2015), Pleurodynia (07/31/2018), Radiculopathy, cervical region (05/14/2014), Tremor, unspecified (11/18/2022), Unspecified injury of unspecified foot, initial encounter (03/29/2017), Unspecified open wound of unspecified upper arm, initial encounter (01/23/2020), Urinary tract infection, site not specified (01/22/2021), and Urinary tract infection, site not specified (01/22/2021).    Surgical History  She has a past surgical history that includes Other surgical history (03/02/2015); Shoulder surgery (02/05/2014); Other surgical history (02/05/2014); Other surgical history (02/05/2014); and Ankle arthroscopy w/ open repair (02/05/2014).     Social History  She reports that she has never smoked. She has never used smokeless tobacco. She reports that she does not currently use alcohol. No history on file for drug use.    Family History  Family History[1]     Allergies  Bacitracin, Erythromycin, Metronidazole, Neomycin, and Penicillins       Last Recorded Vitals  Blood pressure 113/66, pulse 80, temperature 37 °C (98.6 °F), temperature source Temporal, resp. rate 24, height (!) 1.524 m (5'), weight 48.1 kg (106 lb 0.7 oz), SpO2 98%.       Assessment/Plan     [place] [facility]. Pts code status is DNRCC. Pts contact is HPOA daughter Karrie Perez . Pt admit dx of fall, down for hours. Per chart review family wants to focus more on a comfort directed plan of care. Plan to discuss goals with pt/family.    10:30a    Spoke with HPOA daughter Tami at length by phone. Discussed goals and pmhx. Tami agreeable to consult [facility], she would like [place] to return [place] on [facility] if possible. Referral placed to [facility] , will also reach out to [facility] to discuss pt's return. Notified team. Await call back on Hospice mtg time confirmation. Will follow.    1p  [facility] can accept back with increased services. [facility] to meet  bedside today at 3p. Will follow.  7p  Hospice consents signed , transport arranged for 10a  on . [place] aware. Family aware. Pt/family aware and agreeable to Hospice plan of care, discharge and transport. Will follow.    Geeta Pederson, LCSW         [1]   Family History  Problem Relation Name Age of Onset    Thyroid disease Mother           at 94    Hypertension Mother      Other (advanced age) Father           around age 95    Other (advanced age) Sister          lived till mid 90s    Other (advanced age) Sister          lived till mid 90s    Uterine cancer Sister      Other (un known health history) Daughter Amarilis         lives in [place]    No Known Problems Daughter Tami     Other (motorcycle accident) Son          severe accident in 20s, complicated by oxycontin misuse    Other (Cardiac disorder) Other      Hypertension Other

## 2025-08-04 NOTE — PROGRESS NOTES
Spiritual Care Visit  Spiritual Care Request    Reason for Visit:  Routine Visit: Introduction     Request Received From:       Focus of Care:  Visited With: Patient         Refer to :          Spiritual Care Assessment    Spiritual Assessment:                      Care Provided:  Intended Effects: Promote sense of peace, Preserve dignity and respect, Meaning-making  Methods: Offer spiritual/Sabianism support  Interventions: Share words of hope and inspiration, [place]    Sense of Community and or Cheondoism Affiliation:  Christian         Addressed Needs/Concerns and/or Jennifer Through:          Outcome:        Advance Directives:         Spiritual Care Annotation    Annotation:   provided companionship and validation.  Patient spoke about a daughter in the local area.   prayed.

## 2025-08-04 NOTE — DISCHARGE SUMMARY
New discharge date: 8/5/25. Patient being discharged back to her apartment at [place] with higher assistance and hospice consents with [facility] have been signed for them to follow for further needs.    Discharge Diagnosis  Subdural hematoma (Multi)       Issues Requiring Follow-Up    Discharge Meds     Medication List      CONTINUE taking these medications     cyanocobalamin 500 mcg tablet; Commonly known as: Vitamin B-12   flaxseed oiL 1,000 mg capsule   levothyroxine 50 mcg tablet; Commonly known as: Synthroid, Levoxyl; Take   1 tablet (50 mcg) by mouth once daily. On an empty stomach as directed for   thyroid   lutein 20 mg capsule   multivitamin with minerals tablet     STOP taking these medications     docusate sodium 250 mg capsule   lisinopril 10 mg tablet       Test Results Pending At Discharge  Pending Labs       No current pending labs.            Hospital Course   History Of Present Illness  Jennifer Rangel is a 89 y.o. female presenting with a fall.  Patient was near the bathroom and then later states that she was leaving the bathroom thinking that she heard a phone call and when she turned fell on her right side.  Patient states that she may have been down for about 4 hours before she was able to get to her phone call for help.  Patient denies any loss of consciousness.  Patient has history of very frequent falls lately and has been living in independent living but no longer wishes to and wants more care than she is getting.  Daughter is at bedside helping give information.  According to her patient has had multiple falls and multiple head bleeds in the past.  Patient also is more focused on comfort at her age of 89 years.  They are inquiring about palliative care and hospice during the interview.  Discussed whether intensive care unit stay would be desired and they stated no.  They did request however repeat head CT as suggested by neurosurgery was consulted from the emergency department when it  was found that she has a subdural hematoma.  Her quality of life is fairly poor and patient is ready to pass away.  She currently has no complaints including no pain anywhere.  She denies chest pain shortness of breath nausea vomiting diarrhea constipation fever chills dysuria or headache.     In the emergency department her vital signs are stable.  Labs showed an unremarkable CMP.  Troponin was 13.  CBC showed a white blood cell count of 13.6 but was otherwise unremarkable.  UA was unremarkable as well.  CT of her head without IV contrast did reveal 5 mm frontal right superior acute hematoma with no midline shift.  No other fractures were seen in the cervical spine.  She had no facial bone fractures on CT for 3D reconstruction.  Case was discussed with neurosurgery who stated the patient can get a repeat head CT if desired but no surgical intervention and patient would not want this anyway.  Case was discussed with emergency department staff and shared decision was made to admit the patient for further workup of the above.    Hospital course: Patient was admitted to the hospital after having a fall.  CT of her head revealed possible 5 mm subdural hemorrhage.  Patient was kept in stepdown unit and monitored with every 4 hours neurochecks.  Patient's neurologic exam remained stable.  Repeat head CT was done 6 hours after and showed possible hygroma but no concern for head bleed.  Patient worked with physical therapy and Occupational Therapy and plan is for patient to get to higher level of care than her independent living but return to the same apartment.  Palliative care and hospice are consulted and plan for hospice meeting.  Nevertheless patient is medically stable for discharge at this time pending logistics of her placement.    Discharge time greater than 35 minutes. Greater than 50% of time spent on counseling patient, coordination of care, medication reconciliation, transmitting medications to pharmacy and  clarifying plan of care with nursing staff and case management.    Pertinent Physical Exam At Time of Discharge  Physical Exam  Constitutional:       General: She is not in acute distress.     Appearance: Normal appearance.   HENT:      Head: Normocephalic.      Comments: Large bruise over R orbit     Mouth/Throat:      Mouth: Mucous membranes are moist.     Eyes:      Extraocular Movements: Extraocular movements intact.      Conjunctiva/sclera: Conjunctivae normal.       Cardiovascular:      Rate and Rhythm: Normal rate and regular rhythm.      Heart sounds: Normal heart sounds.   Pulmonary:      Effort: Pulmonary effort is normal.      Breath sounds: Normal breath sounds. No wheezing, rhonchi or rales.   Abdominal:      General: Abdomen is flat. Bowel sounds are normal.      Palpations: Abdomen is soft.     Musculoskeletal:         General: No swelling or tenderness. Normal range of motion.      Cervical back: Normal range of motion and neck supple.     Skin:     General: Skin is warm and dry.      Findings: No rash.     Neurological:      General: No focal deficit present.      Mental Status: She is alert. Mental status is at baseline.      Cranial Nerves: No cranial nerve deficit.      Sensory: No sensory deficit.     Psychiatric:         Mood and Affect: Mood normal.         Behavior: Behavior normal.         Outpatient Follow-Up  Future Appointments   Date Time Provider Department Center   11/4/2025  2:00 PM Derrick ePrez MD [id number] [place]         Roby Baum MD

## 2025-08-04 NOTE — CARE PLAN
Problem: Pain - Adult  Goal: Verbalizes/displays adequate comfort level or baseline comfort level  Outcome: Progressing     Problem: Safety - Adult  Goal: Free from fall injury  Outcome: Progressing     Problem: Discharge Planning  Goal: Discharge to home or other facility with appropriate resources  Outcome: Progressing     Problem: Chronic Conditions and Co-morbidities  Goal: Patient's chronic conditions and co-morbidity symptoms are monitored and maintained or improved  Outcome: Progressing     Problem: Nutrition  Goal: Nutrient intake appropriate for maintaining nutritional needs  Outcome: Progressing     Problem: Skin  Goal: Participates in plan/prevention/treatment measures  Outcome: Progressing  Goal: Prevent/manage excess moisture  Outcome: Progressing  Goal: Prevent/minimize sheer/friction injuries  Outcome: Progressing  Goal: Promote/optimize nutrition  Outcome: Progressing  Flowsheets (Taken 8/4/2025 0945)  Promote/optimize nutrition: Monitor/record intake including meals   The patient's goals for the shift include      The clinical goals for the shift include Patient will remain hds by end of shift 8/4/25 at 0700.    Over the shift, the patient did not make progress toward the following goals. Barriers to progression include need for therapy and palliative assessments. Recommendations to address these barriers include as above, involve family.

## 2025-08-05 ENCOUNTER — TELEPHONE (OUTPATIENT)
Dept: PRIMARY CARE | Facility: CLINIC | Age: OVER 89
End: 2025-08-05
Payer: MEDICARE

## 2025-08-05 VITALS
RESPIRATION RATE: 16 BRPM | OXYGEN SATURATION: 98 % | DIASTOLIC BLOOD PRESSURE: 75 MMHG | HEIGHT: 60 IN | BODY MASS INDEX: 20.82 KG/M2 | SYSTOLIC BLOOD PRESSURE: 121 MMHG | WEIGHT: 106.04 LBS | TEMPERATURE: 99 F | HEART RATE: 90 BPM

## 2025-08-05 PROCEDURE — 2500000002 HC RX 250 W HCPCS SELF ADMINISTERED DRUGS (ALT 637 FOR MEDICARE OP, ALT 636 FOR OP/ED): Performed by: INTERNAL MEDICINE

## 2025-08-05 RX ADMIN — LEVOTHYROXINE SODIUM 50 MCG: 0.05 TABLET ORAL at 08:49

## 2025-08-05 ASSESSMENT — PAIN SCALES - GENERAL: PAINLEVEL_OUTOF10: 0 - NO PAIN

## 2025-08-05 ASSESSMENT — PAIN - FUNCTIONAL ASSESSMENT: PAIN_FUNCTIONAL_ASSESSMENT: 0-10

## 2025-08-05 NOTE — CARE PLAN
10a. Plan for discharge back to [facility] on [facility] care. Family and facility aware/agreeable to Hospice plan of care, discharge and transport. Team notified.

## 2025-08-05 NOTE — CARE PLAN
Problem: Pain - Adult  Goal: Verbalizes/displays adequate comfort level or baseline comfort level  Outcome: Progressing     Problem: Safety - Adult  Goal: Free from fall injury  Outcome: Progressing     Problem: Discharge Planning  Goal: Discharge to home or other facility with appropriate resources  Outcome: Progressing     Problem: Chronic Conditions and Co-morbidities  Goal: Patient's chronic conditions and co-morbidity symptoms are monitored and maintained or improved  Outcome: Progressing     Problem: Nutrition  Goal: Nutrient intake appropriate for maintaining nutritional needs  Outcome: Progressing     Problem: Skin  Goal: Decreased wound size/increased tissue granulation at next dressing change  Outcome: Progressing  Flowsheets (Taken 8/5/2025 0459)  Decreased wound size/increased tissue granulation at next dressing change: Promote sleep for wound healing  Goal: Participates in plan/prevention/treatment measures  Outcome: Progressing  Flowsheets (Taken 8/5/2025 0459)  Participates in plan/prevention/treatment measures: Elevate heels  Goal: Prevent/manage excess moisture  Outcome: Progressing  Flowsheets (Taken 8/5/2025 0459)  Prevent/manage excess moisture: Cleanse incontinence/protect with barrier cream  Goal: Prevent/minimize sheer/friction injuries  Outcome: Progressing  Flowsheets (Taken 8/5/2025 0459)  Prevent/minimize sheer/friction injuries: Use pull sheet  Goal: Promote/optimize nutrition  Outcome: Progressing  Flowsheets (Taken 8/5/2025 0459)  Promote/optimize nutrition: Assist with feeding  Goal: Promote skin healing  Outcome: Progressing  Flowsheets (Taken 8/5/2025 0459)  Promote skin healing: Assess skin/pad under line(s)/device(s)   The patient's goals for the shift include      The clinical goals for the shift include Pt will remain free from falls t/o shift tonight.    Over the shift, the patient did remain safe.

## 2025-08-05 NOTE — CARE PLAN
The patient's goals for the shift include      The clinical goals for the shift include pt will remain safe throughout the shift.      Problem: Pain - Adult  Goal: Verbalizes/displays adequate comfort level or baseline comfort level  Outcome: Adequate for Discharge     Problem: Safety - Adult  Goal: Free from fall injury  Outcome: Adequate for Discharge     Problem: Discharge Planning  Goal: Discharge to home or other facility with appropriate resources  Outcome: Adequate for Discharge     Pt was safely discharged back to [place].

## 2025-08-05 NOTE — NURSING NOTE
Report called to Shanika Tran RN. Pt belongings with pt.         Pt agitated with moving at this time of night, charge nurse, Supervisor and Assistant Mgr aware

## 2025-08-05 NOTE — TELEPHONE ENCOUNTER
Fyi Spoke to patient's daughter regarding scheduling TCM for 8/15. Patient's daughter declined any appointments at this time.

## 2025-08-05 NOTE — NURSING NOTE
Pt was safely discharged back to [place] at this time.  Taken via stretcher by private ambulance.  Vitals stable, see flow sheet.   Called and updated that pt was to return.

## 2025-08-27 ENCOUNTER — TELEPHONE (OUTPATIENT)
Dept: PRIMARY CARE | Facility: CLINIC | Age: OVER 89
End: 2025-08-27

## 2025-08-27 ENCOUNTER — NURSING HOME VISIT (OUTPATIENT)
Dept: POST ACUTE CARE | Facility: EXTERNAL LOCATION | Age: OVER 89
End: 2025-08-27
Payer: MEDICARE

## 2025-08-27 DIAGNOSIS — I10 BENIGN ESSENTIAL HYPERTENSION: Primary | ICD-10-CM

## 2025-08-27 DIAGNOSIS — M16.12 PRIMARY LOCALIZED OSTEOARTHRITIS OF LEFT HIP: ICD-10-CM

## 2025-08-27 DIAGNOSIS — Z78.9 HEALTH CARE HOME, ACTIVE CARE COORDINATION: ICD-10-CM

## 2025-08-27 DIAGNOSIS — F43.21 SITUATIONAL DEPRESSION: ICD-10-CM

## 2025-08-27 DIAGNOSIS — M35.00 H/O SJOGREN'S DISEASE (MULTI): ICD-10-CM

## 2025-08-27 PROCEDURE — 99342 HOME/RES VST NEW LOW MDM 30: CPT | Performed by: STUDENT IN AN ORGANIZED HEALTH CARE EDUCATION/TRAINING PROGRAM

## 2025-08-27 ASSESSMENT — ENCOUNTER SYMPTOMS
MUSCULOSKELETAL NEGATIVE: 1
RESPIRATORY NEGATIVE: 1
CARDIOVASCULAR NEGATIVE: 1
GASTROINTESTINAL NEGATIVE: 1
PSYCHIATRIC NEGATIVE: 1
CONSTITUTIONAL NEGATIVE: 1
NEUROLOGICAL NEGATIVE: 1

## 2025-08-31 LAB
ATRIAL RATE: 90 BPM
P AXIS: 78 DEGREES
P OFFSET: 176 MS
P ONSET: 125 MS
PR INTERVAL: 202 MS
Q ONSET: 226 MS
QRS COUNT: 15 BEATS
QRS DURATION: 62 MS
QT INTERVAL: 378 MS
QTC CALCULATION(BAZETT): 462 MS
QTC FREDERICIA: 432 MS
R AXIS: 34 DEGREES
T AXIS: 74 DEGREES
T OFFSET: 415 MS
VENTRICULAR RATE: 90 BPM

## 2025-09-04 ENCOUNTER — APPOINTMENT (OUTPATIENT)
Dept: PRIMARY CARE | Facility: CLINIC | Age: OVER 89
End: 2025-09-04
Payer: MEDICARE

## 2025-09-05 PROBLEM — R53.81 DECLINING FUNCTIONAL STATUS: Status: ACTIVE | Noted: 2025-09-05

## 2025-09-05 PROBLEM — R29.6 RECURRENT FALLS: Status: ACTIVE | Noted: 2025-09-05

## 2025-09-05 PROBLEM — R54 FRAIL ELDERLY: Status: ACTIVE | Noted: 2025-09-05

## 2025-09-05 PROBLEM — Z51.5 ENCOUNTER FOR NURSING HOME HOSPICE CARE: Status: ACTIVE | Noted: 2025-09-05

## 2025-11-04 ENCOUNTER — APPOINTMENT (OUTPATIENT)
Dept: PRIMARY CARE | Facility: CLINIC | Age: OVER 89
End: 2025-11-04
Payer: MEDICARE